# Patient Record
Sex: FEMALE | Race: WHITE | Employment: OTHER | URBAN - METROPOLITAN AREA
[De-identification: names, ages, dates, MRNs, and addresses within clinical notes are randomized per-mention and may not be internally consistent; named-entity substitution may affect disease eponyms.]

---

## 2018-02-05 PROBLEM — I25.9 CHEST PAIN DUE TO MYOCARDIAL ISCHEMIA: Status: ACTIVE | Noted: 2018-02-01

## 2018-02-05 PROBLEM — I25.9 CHEST PAIN DUE TO MYOCARDIAL ISCHEMIA: Status: RESOLVED | Noted: 2018-02-01 | Resolved: 2018-02-05

## 2018-02-05 PROBLEM — I48.91 ATRIAL FIBRILLATION WITH RVR (HCC): Status: ACTIVE | Noted: 2018-02-05

## 2018-02-05 PROBLEM — R07.9 CHEST PAIN: Status: ACTIVE | Noted: 2018-02-05

## 2018-02-05 PROBLEM — I25.9 CHEST PAIN DUE TO MYOCARDIAL ISCHEMIA: Status: ACTIVE | Noted: 2018-02-05

## 2018-02-07 PROBLEM — S72.141A INTERTROCHANTERIC FRACTURE OF RIGHT FEMUR, CLOSED, INITIAL ENCOUNTER (HCC): Status: ACTIVE | Noted: 2018-02-07

## 2018-02-08 PROBLEM — G45.9 TIA (TRANSIENT ISCHEMIC ATTACK): Status: ACTIVE | Noted: 2018-02-05

## 2018-02-08 PROBLEM — E43 SEVERE PROTEIN-CALORIE MALNUTRITION (HCC): Status: ACTIVE | Noted: 2018-02-08

## 2018-02-10 PROBLEM — D62 ACUTE BLOOD LOSS ANEMIA: Status: ACTIVE | Noted: 2018-02-10

## 2018-03-09 PROBLEM — L89.153 DECUBITUS ULCER OF SACRAL REGION, STAGE 3 (HCC): Status: ACTIVE | Noted: 2018-03-09

## 2018-03-16 ENCOUNTER — HOSPITAL ENCOUNTER (OUTPATIENT)
Dept: WOUND CARE | Age: 83
Discharge: HOME | End: 2018-03-16
Payer: MEDICARE

## 2018-03-16 VITALS
DIASTOLIC BLOOD PRESSURE: 60 MMHG | TEMPERATURE: 98.1 F | SYSTOLIC BLOOD PRESSURE: 112 MMHG | RESPIRATION RATE: 18 BRPM | HEART RATE: 86 BPM

## 2018-03-16 PROCEDURE — 11042 DBRDMT SUBQ TIS 1ST 20SQCM/<: CPT

## 2018-03-16 ASSESSMENT — PAIN SCALES - GENERAL: PAINLEVEL_OUTOF10: 8

## 2018-03-16 ASSESSMENT — PAIN DESCRIPTION - DESCRIPTORS: DESCRIPTORS: BURNING

## 2018-03-16 ASSESSMENT — PAIN DESCRIPTION - PAIN TYPE: TYPE: CHRONIC PAIN

## 2018-03-16 ASSESSMENT — PAIN DESCRIPTION - LOCATION: LOCATION: COCCYX

## 2018-03-16 NOTE — PROGRESS NOTES
Outpatient Prescriptions on File Prior to Encounter   Medication Sig Dispense Refill    acetaminophen (TYLENOL) 325 MG tablet Take 2 tablets by mouth every 4 hours as needed for Fever 120 tablet 3    acetaminophen (TYLENOL) 325 MG tablet Take 2 tablets by mouth every 4 hours as needed for Pain 120 tablet 3    amiodarone (CORDARONE) 200 MG tablet Take 1 tablet by mouth 2 times daily 30 tablet 3    ondansetron (ZOFRAN-ODT) 4 MG disintegrating tablet Take 1 tablet by mouth every 8 hours as needed for Nausea or Vomiting      docusate sodium (COLACE, DULCOLAX) 100 MG CAPS Take 100 mg by mouth daily      sennosides-docusate sodium (SENOKOT-S) 8.6-50 MG tablet Take 1 tablet by mouth daily      benzocaine-menthol (CEPACOL SORE THROAT) 15-3.6 MG lozenge Take 1 lozenge by mouth 4 times daily as needed for Sore Throat 168 lozenge     famotidine (PEPCID) 10 MG tablet Take 1 tablet by mouth daily 60 tablet 3    aspirin 81 MG chewable tablet Take 1 tablet by mouth daily 30 tablet 3    nitroGLYCERIN (NITROSTAT) 0.4 MG SL tablet up to max of 3 total doses. If no relief after 1 dose, call 911. 25 tablet 3    clopidogrel (PLAVIX) 75 MG tablet Take 1 tablet by mouth daily 30 tablet 3     No current facility-administered medications on file prior to encounter.         REVIEW OF SYSTEMS See HPI    Objective:    /60   Pulse 86   Temp 98.1 °F (36.7 °C) (Oral)   Resp 18   Wt Readings from Last 3 Encounters:   03/09/18 99 lb (44.9 kg)   03/01/18 99 lb (44.9 kg)   02/09/18 99 lb (44.9 kg)     PHYSICAL EXAM  CONSTITUTIONAL:   Awake, alert, cooperative   EYES:  lids and lashes normal   ENT: external ears and nose without lesions   NECK:  supple, symmetrical, trachea midline   SKIN:  Open wound Present    Assessment:     Patient Active Problem List   Diagnosis Code    Atrial fibrillation with RVR ([facility]) I48.91    Chest pain R07.9    Chest pain due to myocardial ischemia ([facility]) I20.9    Scoliosis (and kyphoscoliosis), idiopathic M41.20    Intertrochanteric fracture of right femur, closed, initial encounter ([facility] [address] S72.141A    History of TIA (transient ischemic attack) G45.9    Severe protein-calorie malnutrition ([facility] [address] E43    Acute blood loss anemia D62    Decubitus ulcer of sacral region, stage 3 ([facility] [address] L89.153     Procedure Note  Indications:  Based on my examination of this patient's wound(s)/ulcer(s) today, debridement is required to promote healing and evaluate the wound base. Performed by: Jm Nino CNP    Consent obtained:  Yes    Time out taken:  Yes    Pain Control: Anesthetic  Anesthetic: 2% Lidocaine Gel Topical     Debridement:Excisional Debridement    Using curette the wound(s)/ulcer(s) was/were sharply debrided down through and including the removal of subcutaneous tissue. Devitalized Tissue Debrided:  slough to stimulate bleeding to promote healing, post debridement good bleeding base and wound edges noted    Pre Debridement Measurements:  Are located in the [place]  Documentation Flow Sheet    Wound/Ulcer #: 1    Post Debridement Measurements:  Wound/Ulcer Descriptions are Pre Debridement except measurements:    Wound 03/09/18  Coccyx #1 Stage 3 acq 2/2/18 (Active)   Wound Image   3/9/2018 10:02 AM   Wound Type Wound 3/9/2018 10:02 AM   Wound Pressure Stage  3 3/9/2018 10:02 AM   Dressing Status Clean;Dry; Intact 3/9/2018 10:53 AM   Dressing Changed Changed/New 3/16/2018 10:20 AM   Dressing/Treatment Alginate;Dry dressing 3/16/2018 10:20 AM   Wound Cleansed Rinsed/Irrigated with saline 3/16/2018 10:20 AM   Dressing Change Due 03/17/18 3/16/2018 10:20 AM   Wound Length (cm) 4.3 cm 3/16/2018 10:08 AM   Wound Width (cm) 1.5 cm 3/16/2018 10:08 AM   Wound Depth (cm)  0.3 3/16/2018 10:08 AM   Calculated Wound Size (cm^2) (l*w) 6.45 cm^2 3/16/2018 10:08 AM   Change in Wound Size % (l*w) -4.88 3/16/2018 10:08 AM   Wound Assessment Brown;Pink;Yellow 3/16/2018  9:35 AM   Drainage Amount Scant 3/16/2018

## 2018-03-23 ENCOUNTER — HOSPITAL ENCOUNTER (OUTPATIENT)
Dept: WOUND CARE | Age: 83
Discharge: HOME | End: 2018-03-23
Payer: MEDICARE

## 2018-03-23 VITALS
DIASTOLIC BLOOD PRESSURE: 82 MMHG | RESPIRATION RATE: 18 BRPM | SYSTOLIC BLOOD PRESSURE: 118 MMHG | HEART RATE: 80 BPM | TEMPERATURE: 98 F

## 2018-03-23 PROCEDURE — 11042 DBRDMT SUBQ TIS 1ST 20SQCM/<: CPT | Performed by: NURSE PRACTITIONER

## 2018-03-23 PROCEDURE — 11042 DBRDMT SUBQ TIS 1ST 20SQCM/<: CPT

## 2018-03-23 ASSESSMENT — PAIN SCALES - GENERAL: PAINLEVEL_OUTOF10: 9

## 2018-03-23 ASSESSMENT — PAIN DESCRIPTION - PAIN TYPE: TYPE: CHRONIC PAIN

## 2018-03-23 ASSESSMENT — PAIN DESCRIPTION - LOCATION: LOCATION: BUTTOCKS

## 2018-03-23 ASSESSMENT — PAIN DESCRIPTION - ORIENTATION: ORIENTATION: RIGHT

## 2018-03-23 NOTE — PROGRESS NOTES
Scoliosis (and kyphoscoliosis), idiopathic M41.20    Intertrochanteric fracture of right femur, closed, initial encounter ([facility] [address] S72.141A    History of TIA (transient ischemic attack) G45.9    Severe protein-calorie malnutrition ([facility] [address] E43    Acute blood loss anemia D62    Decubitus ulcer of sacral region, stage 3 ([facility] [address] L89.153     Procedure Note  Indications:  Based on my examination of this patient's wound(s)/ulcer(s) today, debridement is required to promote healing and evaluate the wound base. Performed by: Favian George CNP    Consent obtained:  Yes    Time out taken:  Yes    Pain Control: Anesthetic  Anesthetic: 2% Lidocaine Gel Topical     Debridement:Excisional Debridement    Using curette the wound(s)/ulcer(s) was/were sharply debrided down through and including the removal of subcutaneous tissue. Devitalized Tissue Debrided:  slough to stimulate bleeding to promote healing, post debridement good bleeding base and wound edges noted    Pre Debridement Measurements:  Are located in the [place]  Documentation Flow Sheet    Wound/Ulcer #: 1    Post Debridement Measurements:  Wound/Ulcer Descriptions are Pre Debridement except measurements:    Wound 03/09/18  Coccyx #1 Stage 3 acq 2/2/18 (Active)   Wound Image   3/9/2018 10:02 AM   Wound Type Wound 3/9/2018 10:02 AM   Wound Pressure Stage  3 3/9/2018 10:02 AM   Dressing Status Clean;Dry; Intact 3/9/2018 10:53 AM   Dressing Changed Changed/New 3/23/2018 11:49 AM   Dressing/Treatment Alginate;Dry dressing 3/23/2018 11:49 AM   Wound Cleansed Rinsed/Irrigated with saline 3/23/2018 11:49 AM   Dressing Change Due 03/24/18 3/23/2018 11:49 AM   Wound Length (cm) 4.1 cm 3/23/2018 10:36 AM   Wound Width (cm) 1.5 cm 3/23/2018 10:36 AM   Wound Depth (cm)  0.3 3/23/2018 10:36 AM   Calculated Wound Size (cm^2) (l*w) 6.15 cm^2 3/23/2018 10:36 AM   Change in Wound Size % (l*w) 0 3/23/2018 10:36 AM   Wound Assessment Yellow;Tan;Slough 3/23/2018 10:36 AM call the [facility] during business hours:     * Increase in Pain  * Temperature over 101  * Increase in drainage from your wound  * Drainage with a foul odor  * Bleeding  * Increase in swelling  * Need for compression bandage changes due to slippage, breakthrough drainage.     If you need medical attention outside of the business hours of the [facility] please contact your PCP or go to the nearest emergency room.         Electronically signed by Harrison Sylvester CNP on 3/23/2018 at 12:06 PM

## 2018-04-13 ENCOUNTER — HOSPITAL ENCOUNTER (OUTPATIENT)
Dept: WOUND CARE | Age: 83
Discharge: HOME | End: 2018-04-13
Payer: MEDICARE

## 2018-04-13 VITALS
SYSTOLIC BLOOD PRESSURE: 118 MMHG | BODY MASS INDEX: 19.44 KG/M2 | RESPIRATION RATE: 18 BRPM | WEIGHT: 99 LBS | HEIGHT: 60 IN | DIASTOLIC BLOOD PRESSURE: 60 MMHG | HEART RATE: 74 BPM | TEMPERATURE: 97.9 F

## 2018-04-13 PROCEDURE — 11042 DBRDMT SUBQ TIS 1ST 20SQCM/<: CPT

## 2018-04-13 PROCEDURE — 11042 DBRDMT SUBQ TIS 1ST 20SQCM/<: CPT | Performed by: NURSE PRACTITIONER

## 2018-04-13 ASSESSMENT — PAIN DESCRIPTION - LOCATION: LOCATION: BUTTOCKS

## 2018-04-13 ASSESSMENT — PAIN DESCRIPTION - FREQUENCY: FREQUENCY: CONTINUOUS

## 2018-04-13 ASSESSMENT — PAIN DESCRIPTION - ONSET: ONSET: ON-GOING

## 2018-04-13 ASSESSMENT — PAIN DESCRIPTION - ORIENTATION: ORIENTATION: RIGHT

## 2018-04-13 ASSESSMENT — PAIN DESCRIPTION - PROGRESSION: CLINICAL_PROGRESSION: NOT CHANGED

## 2018-04-13 ASSESSMENT — PAIN SCALES - WONG BAKER: WONGBAKER_NUMERICALRESPONSE: 2

## 2018-04-13 ASSESSMENT — PAIN DESCRIPTION - DESCRIPTORS: DESCRIPTORS: BURNING;ACHING

## 2018-04-27 ENCOUNTER — HOSPITAL ENCOUNTER (OUTPATIENT)
Dept: WOUND CARE | Age: 83
Discharge: HOME | End: 2018-04-27

## 2018-05-11 ENCOUNTER — HOSPITAL ENCOUNTER (OUTPATIENT)
Dept: WOUND CARE | Age: 83
Discharge: HOME | End: 2018-05-11
Payer: MEDICARE

## 2018-05-11 VITALS
SYSTOLIC BLOOD PRESSURE: 120 MMHG | HEART RATE: 68 BPM | HEIGHT: 60 IN | DIASTOLIC BLOOD PRESSURE: 80 MMHG | RESPIRATION RATE: 16 BRPM | WEIGHT: 99 LBS | BODY MASS INDEX: 19.44 KG/M2 | TEMPERATURE: 97.8 F

## 2018-05-11 DIAGNOSIS — L89.153 DECUBITUS ULCER OF SACRAL REGION, STAGE 3 (HCC): ICD-10-CM

## 2018-05-11 PROCEDURE — 11042 DBRDMT SUBQ TIS 1ST 20SQCM/<: CPT | Performed by: NURSE PRACTITIONER

## 2018-05-11 PROCEDURE — 97597 DBRDMT OPN WND 1ST 20 CM/<: CPT

## 2018-05-11 ASSESSMENT — PAIN DESCRIPTION - LOCATION: LOCATION: COCCYX

## 2018-05-11 ASSESSMENT — PAIN DESCRIPTION - ONSET: ONSET: ON-GOING

## 2018-05-11 ASSESSMENT — PAIN DESCRIPTION - ORIENTATION: ORIENTATION: MID

## 2018-05-11 ASSESSMENT — PAIN DESCRIPTION - PAIN TYPE: TYPE: CHRONIC PAIN

## 2018-05-11 ASSESSMENT — PAIN DESCRIPTION - PROGRESSION: CLINICAL_PROGRESSION: NOT CHANGED

## 2018-05-11 ASSESSMENT — PAIN SCALES - GENERAL: PAINLEVEL_OUTOF10: 2

## 2018-05-11 ASSESSMENT — PAIN DESCRIPTION - FREQUENCY: FREQUENCY: CONTINUOUS

## 2018-05-11 ASSESSMENT — PAIN DESCRIPTION - DESCRIPTORS: DESCRIPTORS: ACHING

## 2018-05-25 ENCOUNTER — HOSPITAL ENCOUNTER (OUTPATIENT)
Dept: WOUND CARE | Age: 83
Discharge: HOME | End: 2018-05-25
Payer: MEDICARE

## 2018-05-25 VITALS
SYSTOLIC BLOOD PRESSURE: 116 MMHG | BODY MASS INDEX: 19.33 KG/M2 | RESPIRATION RATE: 16 BRPM | WEIGHT: 99 LBS | HEART RATE: 74 BPM | TEMPERATURE: 97.8 F | DIASTOLIC BLOOD PRESSURE: 70 MMHG

## 2018-05-25 PROCEDURE — 11042 DBRDMT SUBQ TIS 1ST 20SQCM/<: CPT | Performed by: NURSE PRACTITIONER

## 2018-05-25 PROCEDURE — 11042 DBRDMT SUBQ TIS 1ST 20SQCM/<: CPT

## 2018-06-08 ENCOUNTER — HOSPITAL ENCOUNTER (OUTPATIENT)
Dept: WOUND CARE | Age: 83
Discharge: HOME | End: 2018-06-08
Payer: MEDICARE

## 2018-06-08 VITALS
TEMPERATURE: 97.7 F | SYSTOLIC BLOOD PRESSURE: 100 MMHG | DIASTOLIC BLOOD PRESSURE: 70 MMHG | HEART RATE: 80 BPM | RESPIRATION RATE: 16 BRPM

## 2018-06-08 PROCEDURE — 11042 DBRDMT SUBQ TIS 1ST 20SQCM/<: CPT | Performed by: NURSE PRACTITIONER

## 2018-06-08 PROCEDURE — 11042 DBRDMT SUBQ TIS 1ST 20SQCM/<: CPT

## 2018-06-29 ENCOUNTER — HOSPITAL ENCOUNTER (OUTPATIENT)
Dept: WOUND CARE | Age: 83
Discharge: HOME | End: 2018-06-29
Payer: MEDICARE

## 2018-06-29 VITALS
HEART RATE: 80 BPM | RESPIRATION RATE: 16 BRPM | DIASTOLIC BLOOD PRESSURE: 68 MMHG | WEIGHT: 100 LBS | SYSTOLIC BLOOD PRESSURE: 112 MMHG | TEMPERATURE: 97.5 F | BODY MASS INDEX: 19.53 KG/M2

## 2018-06-29 PROCEDURE — 11042 DBRDMT SUBQ TIS 1ST 20SQCM/<: CPT | Performed by: NURSE PRACTITIONER

## 2018-06-29 PROCEDURE — 11042 DBRDMT SUBQ TIS 1ST 20SQCM/<: CPT

## 2018-07-13 ENCOUNTER — HOSPITAL ENCOUNTER (OUTPATIENT)
Dept: WOUND CARE | Age: 83
Discharge: HOME | End: 2018-07-13
Payer: MEDICARE

## 2018-07-13 VITALS
DIASTOLIC BLOOD PRESSURE: 70 MMHG | BODY MASS INDEX: 21.4 KG/M2 | TEMPERATURE: 97.4 F | HEIGHT: 60 IN | SYSTOLIC BLOOD PRESSURE: 120 MMHG | HEART RATE: 78 BPM | RESPIRATION RATE: 18 BRPM | WEIGHT: 109 LBS

## 2018-07-13 PROCEDURE — 11042 DBRDMT SUBQ TIS 1ST 20SQCM/<: CPT

## 2018-07-13 PROCEDURE — 11042 DBRDMT SUBQ TIS 1ST 20SQCM/<: CPT | Performed by: NURSE PRACTITIONER

## 2018-07-13 ASSESSMENT — PAIN DESCRIPTION - PAIN TYPE: TYPE: CHRONIC PAIN

## 2018-07-13 ASSESSMENT — PAIN DESCRIPTION - FREQUENCY: FREQUENCY: CONTINUOUS

## 2018-07-13 ASSESSMENT — PAIN DESCRIPTION - LOCATION: LOCATION: COCCYX

## 2018-07-13 ASSESSMENT — PAIN DESCRIPTION - ONSET: ONSET: ON-GOING

## 2018-07-13 ASSESSMENT — PAIN DESCRIPTION - PROGRESSION: CLINICAL_PROGRESSION: NOT CHANGED

## 2018-07-13 ASSESSMENT — PAIN SCALES - WONG BAKER: WONGBAKER_NUMERICALRESPONSE: 2

## 2018-07-13 ASSESSMENT — PAIN SCALES - GENERAL: PAINLEVEL_OUTOF10: 2

## 2018-07-13 NOTE — PROGRESS NOTES
by pressure    Procedural Pain:  3  / 10   Post Procedural Pain:  0 / 10     Response to treatment:  Well tolerated by patient. Plan:   Treatment Note please see attached Discharge Instructions    Written patient dismissal instructions given to patient and signed by patient or POA. Discharge Instructions       Visit Discharge/Physician Orders     Discharge condition: Stable     Assessment of pain at discharge: NONE      Anesthetic used:  2% LIDOCAINE      Discharge to: ECF      Left via:ambulance     Accompanied by: family      [facility]:  [facility] [place]      Dressing Orders:  COCCYX:   CLEANSE WOUND WITH NORMAL STERILE SALINE. Pack lightly with AQUACEL THEN APPLY dry dressing  And secure. Change daily. AND AS NEEDED. MAKE SURE TO LIGHTLY PACK AQUACEL IN UNDERMINING area.     MAINTAIN KOEHLER CATH UNTIL STAGE 3 PRESSURE ULCER IS HEALED      Treatment Orders:  FOLLOW NUTRITIOUS DIET. CHOOSE FOODS HIGH IN PROTEIN -CHICKEN- FISH-AND EGGS,  CHOOSE FOODS HIGH IN VITAMIN C.   MULTIVITAMIN DAILY.    LOW AIR LOSS MATTRESS  BED.    REPOSITION EVERY 1-2 HOURS. KEEP PRESSURE OFF WOUNDS. - SIDE TO SIDE AS MUCH AS POSSIBLE      NOT TO SIT FOR PROLONGED PERIOD OF TIMES.      [facility] followup visit ______3 WEEKS ______________________  (Please note your next appointment above and if you are unable to keep, kindly give a 24 hour notice.  Thank you.)     Physician signature:__________________________        If you experience any of the following, please call the [facility] during business hours:     * Increase in Pain  * Temperature over 101  * Increase in drainage from your wound  * Drainage with a foul odor  * Bleeding  * Increase in swelling  * Need for compression bandage changes due to slippage, breakthrough drainage.     If you need medical attention outside of the business hours of the [facility] please contact your PCP or go to the nearest emergency room.              Electronically signed by Armando Olivier Dalton Daly - CNP on 7/13/2018 at 10:51 AM

## 2018-08-03 ENCOUNTER — HOSPITAL ENCOUNTER (OUTPATIENT)
Dept: WOUND CARE | Age: 83
Discharge: HOME | End: 2018-08-03
Payer: MEDICARE

## 2018-08-03 VITALS
DIASTOLIC BLOOD PRESSURE: 68 MMHG | WEIGHT: 109 LBS | SYSTOLIC BLOOD PRESSURE: 118 MMHG | RESPIRATION RATE: 18 BRPM | HEIGHT: 60 IN | TEMPERATURE: 98.2 F | BODY MASS INDEX: 21.4 KG/M2 | HEART RATE: 72 BPM

## 2018-08-03 PROCEDURE — 11042 DBRDMT SUBQ TIS 1ST 20SQCM/<: CPT

## 2018-08-03 PROCEDURE — 11042 DBRDMT SUBQ TIS 1ST 20SQCM/<: CPT | Performed by: NURSE PRACTITIONER

## 2018-08-03 NOTE — PROGRESS NOTES
Debridement    Using curette the wound(s)/ulcer(s) was/were sharply debrided down through and including the removal of subcutaneous tissue. Devitalized Tissue Debrided:  biofilm to stimulate bleeding to promote healing, post debridement good bleeding base and wound edges noted    Pre Debridement Measurements:  Are located in the [place]  Documentation Flow Sheet    Wound/Ulcer #: 1    Post Debridement Measurements:  Wound/Ulcer Descriptions are Pre Debridement except measurements:    Wound 03/09/18  Coccyx #1 Stage 3 acq 2/2/18 (Active)   Wound Image   6/29/2018  8:19 AM   Wound Type Wound 3/9/2018 10:02 AM   Wound Pressure Stage  3 3/9/2018 10:02 AM   Dressing Status Clean;Dry; Intact 7/13/2018 10:46 AM   Dressing Changed Changed/New 7/13/2018 10:46 AM   Dressing/Treatment Alginate;Dry dressing 7/13/2018 10:46 AM   Wound Cleansed Rinsed/Irrigated with saline 7/13/2018 10:46 AM   Dressing Change Due 06/09/18 6/8/2018  9:13 AM   Wound Length (cm) 2.2 cm 8/3/2018  8:53 AM   Wound Width (cm) 0.7 cm 8/3/2018  8:53 AM   Wound Depth (cm)  0.7 8/3/2018  8:53 AM   Calculated Wound Size (cm^2) (l*w) 1.54 cm^2 8/3/2018  8:53 AM   Change in Wound Size % (l*w) 74.96 8/3/2018  8:53 AM   Undermining Starts ___ O'Clock 1 8/3/2018  8:44 AM   Undermining Ends___ O'Clock 2 8/3/2018  8:44 AM   Undermining Maxium Distance (cm) 0.8 8/3/2018  8:44 AM   Wound Assessment Pink;Yellow 8/3/2018  8:44 AM   Drainage Amount Moderate 8/3/2018  8:44 AM   Drainage Description Yellow 8/3/2018  8:44 AM   Odor None 7/13/2018 10:20 AM   Divya-wound Assessment Pink 8/3/2018  8:44 AM   Yellow%Wound Bed 100 3/23/2018 10:36 AM   Time out Yes 8/3/2018  8:53 AM   Procedural Pain 0 8/3/2018  8:53 AM   Post procedural Pain 0 8/3/2018  8:53 AM   Number of days: 146       Incision 02/07/18 Hip Right (Active)   Number of days: 176     Percent of Wound/Ulcer Debrided: 100%    Total Surface Area Debrided:  1. sq cm     Estimated Blood Loss: Minimal  Hemostasis Achieved:  not needed    Procedural Pain:  3  / 10   Post Procedural Pain:  3 / 10     Response to treatment:  Well tolerated by patient. Plan:   Treatment Note please see attached Discharge Instructions    Written patient dismissal instructions given to patient and signed by patient or POA. Discharge Instructions       Visit Discharge/Physician Orders     Discharge condition: Stable     Assessment of pain at discharge: NONE      Anesthetic used:  2% LIDOCAINE      Discharge to: ECF      Left via:ambulance     Accompanied by: family      [facility]      Dressing Orders:  COCCYX:   CLEANSE WOUND WITH NORMAL STERILE SALINE. Pack lightly with AQUACEL THEN APPLY dry dressing  And secure. Change daily. AND AS NEEDED. MAKE SURE TO LIGHTLY PACK AQUACEL IN UNDERMINING area.     MAINTAIN KOEHLER CATH UNTIL STAGE 3 PRESSURE ULCER IS HEALED      Treatment Orders:  FOLLOW NUTRITIOUS DIET. CHOOSE FOODS HIGH IN PROTEIN -CHICKEN- FISH-AND EGGS,  CHOOSE FOODS HIGH IN VITAMIN C.   MULTIVITAMIN DAILY.    LOW AIR LOSS MATTRESS  BED.    REPOSITION EVERY 1-2 HOURS. KEEP PRESSURE OFF WOUNDS. - SIDE TO SIDE AS MUCH AS POSSIBLE      NOT TO SIT FOR PROLONGED PERIOD OF TIMES.      [facility] followup visit ______3 WEEKS ______________________  (Please note your next appointment above and if you are unable to keep, kindly give a 24 hour notice.  Thank you.)     Physician signature:__________________________        If you experience any of the following, please call the [facility] during business hours:     * Increase in Pain  * Temperature over 101  * Increase in drainage from your wound  * Drainage with a foul odor  * Bleeding  * Increase in swelling  * Need for compression bandage changes due to slippage, breakthrough drainage.     If you need medical attention outside of the business hours of the [facility] please contact your PCP or go to the nearest emergency

## 2018-08-24 ENCOUNTER — HOSPITAL ENCOUNTER (OUTPATIENT)
Dept: WOUND CARE | Age: 83
Discharge: HOME | End: 2018-08-24
Payer: MEDICARE

## 2018-08-24 VITALS
RESPIRATION RATE: 16 BRPM | HEART RATE: 76 BPM | SYSTOLIC BLOOD PRESSURE: 122 MMHG | TEMPERATURE: 97.8 F | HEIGHT: 60 IN | BODY MASS INDEX: 23.16 KG/M2 | WEIGHT: 118 LBS | DIASTOLIC BLOOD PRESSURE: 74 MMHG

## 2018-08-24 PROBLEM — L89.152 DECUBITUS ULCER OF SACRAL REGION, STAGE 2 (HCC): Status: ACTIVE | Noted: 2018-03-09

## 2018-08-24 PROCEDURE — 97597 DBRDMT OPN WND 1ST 20 CM/<: CPT

## 2018-08-24 PROCEDURE — 97597 DBRDMT OPN WND 1ST 20 CM/<: CPT | Performed by: NURSE PRACTITIONER

## 2018-08-24 NOTE — PROGRESS NOTES
[facility] Followup Visit Note    Referring Physician : MD Starr Tafoya Bri Villasenor RECORD NUMBER:  [id number]  AGE: 80 y.o. GENDER: female  : 1922  EPISODE DATE:  2018    Subjective:     Chief Complaint   Patient presents with    Wound Check     coccyx      HISTORY of PRESENT ILLNESS SORIN Baxter is a 80 y.o. female who presents today for wound/ulcer evaluation. History of Wound Context: The patient has had a wound of of the sacral region which was first noted approximately mid 2017. It was discovered following a hospitalization for a fractured femur. She now resides in a nursing home She has been making steady progress with consistent offloading. The wound has improved to a stage 2 pressure ulcer.      Wound/Ulcer Pain Timing/Severity: constant  Quality of pain: burning  Severity:  2 / 10   Modifying Factors: Pain worsens with pressure  Associated Signs/Symptoms: pain     Ulcer Identification:  Ulcer Type: pressure  Contributing Factors: chronic pressure, decreased mobility and shear force     Diabetic/Pressure/Non Pressure Ulcers only:  Ulcer: Pressure ulcer, Stage 2     Wound: N/A        PAST MEDICAL HISTORY      Diagnosis Date    Acute blood loss anemia 2/10/2018    Atrial fibrillation with RVR ([facility]) 2018    Chest pain due to myocardial ischemia 2018    Scoliosis (and kyphoscoliosis), idiopathic      Past Surgical History:   Procedure Laterality Date    EYE SURGERY  2012    cataracts    FRACTURE SURGERY      FRACTURE SURGERY Right 2018    ORIF right hip     No family history on file.   Social History   Substance Use Topics    Smoking status: Never Smoker    Smokeless tobacco: Never Used    Alcohol use No     Allergies   Allergen Reactions    Shellfish-Derived Products Anaphylaxis    Pcn [Penicillins]     Adhesive Tape Rash     Current Outpatient Prescriptions on File Prior to Encounter   Medication Sig Dispense Refill    Anesthetic  Anesthetic: 2% Lidocaine Gel Topical     Debridement:Non-excisional Debridement    Using curette the wound(s)/ulcer(s) was/were sharply debrided down through and including the removal of epidermis. Devitalized Tissue Debrided:  biofilm to stimulate bleeding to promote healing, post debridement good bleeding base and wound edges noted    Pre Debridement Measurements:  Are located in the [place]  Documentation Flow Sheet    Wound/Ulcer #: 1    Post Debridement Measurements:  Wound/Ulcer Descriptions are Pre Debridement except measurements:    Wound 03/09/18  Coccyx #1 Stage 3 acq 2/2/18 (Active)   Wound Image   8/24/2018  8:34 AM   Wound Type Wound 3/9/2018 10:02 AM   Wound Pressure Stage  3 3/9/2018 10:02 AM   Dressing Status Clean;Dry; Intact 8/24/2018  9:24 AM   Dressing Changed Changed/New 8/24/2018  9:24 AM   Dressing/Treatment Alginate;Dry dressing 8/24/2018  9:24 AM   Wound Cleansed Rinsed/Irrigated with saline 8/24/2018  9:24 AM   Dressing Change Due 08/04/18 8/3/2018  8:53 AM   Wound Length (cm) 1.9 cm 8/24/2018  9:13 AM   Wound Width (cm) 0.7 cm 8/24/2018  9:13 AM   Wound Depth (cm)  0.5 8/24/2018  9:13 AM   Calculated Wound Size (cm^2) (l*w) 1.33 cm^2 8/24/2018  9:13 AM   Change in Wound Size % (l*w) 78.37 8/24/2018  9:13 AM   Undermining Starts ___ O'Clock 1 8/24/2018  8:34 AM   Undermining Ends___ O'Clock 2 8/24/2018  8:34 AM   Undermining Maxium Distance (cm) 0.5 8/24/2018  8:34 AM   Wound Assessment Pink;Yellow 8/24/2018  8:34 AM   Drainage Amount Small 8/24/2018  8:34 AM   Drainage Description Serosanguinous 8/24/2018  8:34 AM   Odor None 7/13/2018 10:20 AM   Divya-wound Assessment Pink; Maceration 8/24/2018  8:34 AM   Yellow%Wound Bed 100 3/23/2018 10:36 AM   Time out Yes 8/24/2018  9:13 AM   Procedural Pain 1 8/24/2018  9:13 AM   Post procedural Pain 0 8/24/2018  9:13 AM   Number of days: 167       Incision 02/07/18 Hip Right (Active)   Number of days: 197     Percent of Wound/Ulcer Debrided: 100%    Total Surface Area Debrided:  1.3 sq cm     Estimated Blood Loss:  Minimal  Hemostasis Achieved:  by pressure    Procedural Pain:  0  / 10   Post Procedural Pain:  0 / 10     Response to treatment:  Well tolerated by patient. Plan:   Treatment Note please see attached Discharge Instructions    Written patient dismissal instructions given to patient and signed by patient or POA. Discharge Instructions       Visit Discharge/Physician Orders     Discharge condition: Stable     Assessment of pain at discharge: NONE      Anesthetic used:  2% LIDOCAINE GEL     Discharge to: ECF      Left via:ambulance     Accompanied by: family      [facility]      Dressing Orders:  COCCYX:   CLEANSE WOUND WITH NORMAL STERILE SALINE. Pack lightly with AQUACEL THEN APPLY dry dressing  And secure. Change daily. AND AS NEEDED. MAKE SURE TO LIGHTLY PACK AQUACEL IN UNDERMINING area.     MAINTAIN KOEHLER CATH UNTIL STAGE 3 PRESSURE ULCER IS HEALED      Treatment Orders:  FOLLOW NUTRITIOUS DIET. CHOOSE FOODS HIGH IN PROTEIN -CHICKEN- FISH-AND EGGS,  CHOOSE FOODS HIGH IN VITAMIN C.   MULTIVITAMIN DAILY.      LOW AIR LOSS MATTRESS  BED.      REPOSITION EVERY 1-2 HOURS. KEEP PRESSURE OFF WOUNDS. - SIDE TO SIDE AS MUCH AS POSSIBLE      NOT TO SIT FOR PROLONGED PERIOD OF TIMES.      [facility] followup visit 3 WEEKS ______________________  (Please note your next appointment above and if you are unable to keep, kindly give a 24 hour notice.  Thank you.)     Physician signature:__________________________        If you experience any of the following, please call the [facility] during business hours:     * Increase in Pain  * Temperature over 101  * Increase in drainage from your wound  * Drainage with a foul odor  * Bleeding  * Increase in swelling  * Need for compression bandage changes due to slippage, breakthrough drainage.     If you need medical attention outside of the business hours of

## 2018-09-14 ENCOUNTER — HOSPITAL ENCOUNTER (OUTPATIENT)
Dept: WOUND CARE | Age: 83
Discharge: HOME | End: 2018-09-14
Payer: MEDICARE

## 2018-09-14 VITALS
RESPIRATION RATE: 16 BRPM | HEART RATE: 64 BPM | SYSTOLIC BLOOD PRESSURE: 112 MMHG | DIASTOLIC BLOOD PRESSURE: 62 MMHG | TEMPERATURE: 97.9 F

## 2018-09-14 PROCEDURE — 97597 DBRDMT OPN WND 1ST 20 CM/<: CPT | Performed by: NURSE PRACTITIONER

## 2018-09-14 PROCEDURE — 97597 DBRDMT OPN WND 1ST 20 CM/<: CPT

## 2018-10-05 ENCOUNTER — HOSPITAL ENCOUNTER (OUTPATIENT)
Dept: WOUND CARE | Age: 83
Discharge: HOME | End: 2018-10-05
Payer: MEDICARE

## 2018-10-05 VITALS
RESPIRATION RATE: 18 BRPM | SYSTOLIC BLOOD PRESSURE: 118 MMHG | TEMPERATURE: 97.2 F | DIASTOLIC BLOOD PRESSURE: 64 MMHG | HEART RATE: 72 BPM

## 2018-10-05 PROCEDURE — 97597 DBRDMT OPN WND 1ST 20 CM/<: CPT | Performed by: NURSE PRACTITIONER

## 2018-10-05 PROCEDURE — 11042 DBRDMT SUBQ TIS 1ST 20SQCM/<: CPT

## 2018-10-05 NOTE — PROGRESS NOTES
Dispense Refill    acetaminophen (TYLENOL) 325 MG tablet Take 2 tablets by mouth every 4 hours as needed for Fever 120 tablet 3    amiodarone (CORDARONE) 200 MG tablet Take 1 tablet by mouth 2 times daily 30 tablet 3    ondansetron (ZOFRAN-ODT) 4 MG disintegrating tablet Take 1 tablet by mouth every 8 hours as needed for Nausea or Vomiting      docusate sodium (COLACE, DULCOLAX) 100 MG CAPS Take 100 mg by mouth daily      sennosides-docusate sodium (SENOKOT-S) 8.6-50 MG tablet Take 1 tablet by mouth daily      benzocaine-menthol (CEPACOL SORE THROAT) 15-3.6 MG lozenge Take 1 lozenge by mouth 4 times daily as needed for Sore Throat 168 lozenge     famotidine (PEPCID) 10 MG tablet Take 1 tablet by mouth daily 60 tablet 3    aspirin 81 MG chewable tablet Take 1 tablet by mouth daily 30 tablet 3    nitroGLYCERIN (NITROSTAT) 0.4 MG SL tablet up to max of 3 total doses. If no relief after 1 dose, call 911. 25 tablet 3    clopidogrel (PLAVIX) 75 MG tablet Take 1 tablet by mouth daily 30 tablet 3     No current facility-administered medications on file prior to encounter. REVIEW OF SYSTEMS See HPI    Objective:    /64   Pulse 72   Temp 97.2 °F (36.2 °C) (Oral)   Resp 18   Wt Readings from Last 3 Encounters:   08/24/18 118 lb (53.5 kg)   08/03/18 109 lb (49.4 kg)   07/13/18 109 lb (49.4 kg)     PHYSICAL EXAM  CONSTITUTIONAL:   Awake, alert, cooperative   EYES:  lids and lashes normal   ENT: external ears and nose without lesions   NECK:  supple, symmetrical, trachea midline   SKIN:  Open wound Present    Assessment:     Problem List Items Addressed This Visit     None        Procedure Note  Indications:  Based on my examination of this patient's wound(s)/ulcer(s) today, debridement is required to promote healing and evaluate the wound base.     Performed by: Duke Chin, CALLIE Hyman CNP    Consent obtained:  Yes    Time out taken:  Yes    Pain Control: Anesthetic  Anesthetic: 2% Lidocaine Gel Topical

## 2018-10-26 ENCOUNTER — HOSPITAL ENCOUNTER (OUTPATIENT)
Dept: WOUND CARE | Age: 83
Discharge: HOME | End: 2018-10-26
Payer: MEDICARE

## 2018-10-26 VITALS
DIASTOLIC BLOOD PRESSURE: 80 MMHG | SYSTOLIC BLOOD PRESSURE: 122 MMHG | RESPIRATION RATE: 18 BRPM | HEIGHT: 60 IN | HEART RATE: 72 BPM | TEMPERATURE: 97.6 F | WEIGHT: 118 LBS | BODY MASS INDEX: 23.16 KG/M2

## 2018-10-26 DIAGNOSIS — L89.152 DECUBITUS ULCER OF SACRAL REGION, STAGE 2 (HCC): ICD-10-CM

## 2018-10-26 PROCEDURE — 97597 DBRDMT OPN WND 1ST 20 CM/<: CPT | Performed by: NURSE PRACTITIONER

## 2018-10-26 PROCEDURE — 97597 DBRDMT OPN WND 1ST 20 CM/<: CPT

## 2018-10-26 ASSESSMENT — PAIN DESCRIPTION - ONSET: ONSET: ON-GOING

## 2018-10-26 ASSESSMENT — PAIN DESCRIPTION - FREQUENCY: FREQUENCY: CONTINUOUS

## 2018-10-26 ASSESSMENT — PAIN SCALES - GENERAL: PAINLEVEL_OUTOF10: 5

## 2018-10-26 ASSESSMENT — PAIN DESCRIPTION - DESCRIPTORS: DESCRIPTORS: BURNING

## 2018-10-26 ASSESSMENT — PAIN DESCRIPTION - PROGRESSION: CLINICAL_PROGRESSION: NOT CHANGED

## 2018-10-26 ASSESSMENT — PAIN DESCRIPTION - ORIENTATION: ORIENTATION: MID

## 2018-10-26 ASSESSMENT — PAIN DESCRIPTION - PAIN TYPE: TYPE: CHRONIC PAIN

## 2018-10-26 ASSESSMENT — PAIN DESCRIPTION - LOCATION: LOCATION: COCCYX

## 2018-10-26 NOTE — PROGRESS NOTES
taken: Yes    Pain Control: Anesthetic  Anesthetic: 2% Lidocaine Gel Topical     Debridement:Non-excisional Debridement    Using curette the wound(s)/ulcer(s) was/were sharply debrided down through and including the removal of dermis. Devitalized Tissue Debrided:  slough to stimulate bleeding to promote healing, post debridement good bleeding base and wound edges noted    Pre Debridement Measurements:  Are located in the [place]  Documentation Flow Sheet    Wound/Ulcer #: 1    Post Debridement Measurements:  Wound/Ulcer Descriptions are Pre Debridement except measurements:    Wound 03/09/18  Coccyx #1 Stage 3 acq 2/2/18 (Active)   Wound Image   10/26/2018  8:19 AM   Wound Type Wound 3/9/2018 10:02 AM   Wound Pressure Stage  3 3/9/2018 10:02 AM   Dressing Status Clean;Dry; Intact 8/24/2018  9:24 AM   Dressing Changed Changed/New 10/5/2018  9:09 AM   Dressing/Treatment Alginate;Dry dressing 10/5/2018  9:09 AM   Wound Cleansed Wound cleanser 10/26/2018  8:19 AM   Dressing Change Due 10/06/18 10/5/2018  9:09 AM   Wound Length (cm) 1.6 cm 10/26/2018  8:36 AM   Wound Width (cm) 0.4 cm 10/26/2018  8:36 AM   Wound Depth (cm)  1 10/26/2018  8:36 AM   Calculated Wound Size (cm^2) (l*w) 0.64 cm^2 10/26/2018  8:36 AM   Change in Wound Size % (l*w) 89.59 10/26/2018  8:36 AM   Distance Tunneling (cm) 0.2 cm 10/5/2018  7:59 AM   Tunneling Position ___ O'Clock 12 10/5/2018  7:59 AM   Undermining Starts ___ O'Clock 9 9/14/2018  8:22 AM   Undermining Ends___ O'Clock 11 9/14/2018  8:22 AM   Undermining Maxium Distance (cm) 0.3 9/14/2018  8:22 AM   Wound Assessment Red 10/26/2018  8:19 AM   Drainage Amount Moderate 10/26/2018  8:19 AM   Drainage Description Yellow 10/26/2018  8:19 AM   Odor None 10/26/2018  8:19 AM   Divya-wound Assessment Excoriated 10/26/2018  8:19 AM   Yellow%Wound Bed 100 3/23/2018 10:36 AM   Time out Yes 10/26/2018  8:36 AM   Procedural Pain 0 10/26/2018  8:36 AM   Post procedural Pain 0 10/26/2018  8:36 AM   Number of days: 230       Incision 02/07/18 Hip Right (Active)   Number of days: 260     Percent of Wound/Ulcer Debrided: 100%    Total Surface Area Debrided:  0.6 sq cm     Estimated Blood Loss:  Minimal  Hemostasis Achieved:  by pressure    Procedural Pain:  2  / 10   Post Procedural Pain:  0 / 10     Response to treatment:  Well tolerated by patient. Plan:   Treatment Note please see attached Discharge Instructions    Written patient dismissal instructions given to patient and signed by patient or POA. Discharge Instructions       Visit Discharge/Physician Orders     Discharge condition: Stable     Assessment of pain at discharge: NONE      Anesthetic used:  2% LIDOCAINE GEL     Discharge to: ECF      Left via:ambulance     Accompanied by: family      [facility]      Dressing Orders:  COCCYX:   CLEANSE WOUND WITH NORMAL STERILE SALINE. Pack lightly with AQUACEL THEN APPLY dry dressing  And secure. Change daily. AND AS NEEDED. MAKE SURE TO LIGHTLY PACK AQUACEL IN UNDERMINING area. Please order a mepilex sacryl bordered gauze or equivalent to protect entire coccyx/sacryl area because it is starting to break down and become excoriated. May use zinc or other barrier protection cream to rosario wound with dressing changes.     MAINTAIN KOEHLER CATH UNTIL STAGE 3 PRESSURE ULCER IS HEALED      Treatment Orders:  FOLLOW NUTRITIOUS DIET. CHOOSE FOODS HIGH IN PROTEIN -CHICKEN- FISH-AND EGGS,  CHOOSE FOODS HIGH IN VITAMIN C.   MULTIVITAMIN DAILY.       LOW AIR LOSS MATTRESS  BED.       REPOSITION EVERY 1-2 HOURS. KEEP PRESSURE OFF WOUNDS. - SIDE TO SIDE AS MUCH AS POSSIBLE      NOT TO SIT FOR PROLONGED PERIOD OF TIMES.      [facility] followup visit 3 WEEKS ______________________  (Please note your next appointment above and if you are unable to keep, kindly give a 24 hour notice.  Thank you.)     Physician signature:__________________________        If you experience any of the following, please call the [facility] during business hours:     * Increase in Pain  * Temperature over 101  * Increase in drainage from your wound  * Drainage with a foul odor  * Bleeding  * Increase in swelling  * Need for compression bandage changes due to slippage, breakthrough drainage.     If you need medical attention outside of the business hours of the [facility] please contact your PCP or go to the nearest emergency room.         Electronically signed by CALLIE Naqvi CNP on 10/26/2018 at 8:48 AM

## 2018-11-16 ENCOUNTER — HOSPITAL ENCOUNTER (OUTPATIENT)
Dept: WOUND CARE | Age: 83
Discharge: HOME | End: 2018-11-16
Payer: MEDICARE

## 2018-11-16 VITALS
DIASTOLIC BLOOD PRESSURE: 64 MMHG | RESPIRATION RATE: 18 BRPM | SYSTOLIC BLOOD PRESSURE: 100 MMHG | HEART RATE: 72 BPM | BODY MASS INDEX: 23.05 KG/M2 | TEMPERATURE: 97.9 F | WEIGHT: 118 LBS

## 2018-11-16 DIAGNOSIS — L89.323 DECUBITUS ULCER OF LEFT BUTTOCK, STAGE 3 (HCC): ICD-10-CM

## 2018-11-16 PROCEDURE — 11042 DBRDMT SUBQ TIS 1ST 20SQCM/<: CPT | Performed by: NURSE PRACTITIONER

## 2018-11-16 PROCEDURE — 11042 DBRDMT SUBQ TIS 1ST 20SQCM/<: CPT

## 2018-11-16 NOTE — PROGRESS NOTES
Encounter   Medication Sig Dispense Refill    acetaminophen (TYLENOL) 325 MG tablet Take 2 tablets by mouth every 4 hours as needed for Fever 120 tablet 3    amiodarone (CORDARONE) 200 MG tablet Take 1 tablet by mouth 2 times daily 30 tablet 3    ondansetron (ZOFRAN-ODT) 4 MG disintegrating tablet Take 1 tablet by mouth every 8 hours as needed for Nausea or Vomiting      docusate sodium (COLACE, DULCOLAX) 100 MG CAPS Take 100 mg by mouth daily      sennosides-docusate sodium (SENOKOT-S) 8.6-50 MG tablet Take 1 tablet by mouth daily      benzocaine-menthol (CEPACOL SORE THROAT) 15-3.6 MG lozenge Take 1 lozenge by mouth 4 times daily as needed for Sore Throat 168 lozenge     famotidine (PEPCID) 10 MG tablet Take 1 tablet by mouth daily 60 tablet 3    aspirin 81 MG chewable tablet Take 1 tablet by mouth daily 30 tablet 3    nitroGLYCERIN (NITROSTAT) 0.4 MG SL tablet up to max of 3 total doses. If no relief after 1 dose, call 911. 25 tablet 3    clopidogrel (PLAVIX) 75 MG tablet Take 1 tablet by mouth daily 30 tablet 3     No current facility-administered medications on file prior to encounter. REVIEW OF SYSTEMS See HPI    Objective:    /64   Pulse 72   Temp 97.9 °F (36.6 °C) (Oral)   Resp 18   Wt 118 lb (53.5 kg)   BMI 23.05 kg/m²   Wt Readings from Last 3 Encounters:   11/16/18 118 lb (53.5 kg)   10/26/18 118 lb (53.5 kg)   08/24/18 118 lb (53.5 kg)     PHYSICAL EXAM  CONSTITUTIONAL:   Awake, alert, cooperative   EYES:  lids and lashes normal   ENT: external ears and nose without lesions   NECK:  supple, symmetrical, trachea midline   SKIN:  Open wound Present    Assessment:     Problem List Items Addressed This Visit     Decubitus ulcer of left buttock, stage 3 (HCC)        Procedure Note  Indications:  Based on my examination of this patient's wound(s)/ulcer(s) today, debridement is required to promote healing and evaluate the wound base.     Performed by: Paola Skiff, APRN - 8:45 AM   Procedural Pain 0 11/16/2018  8:45 AM   Post procedural Pain 0 11/16/2018  8:45 AM   Number of days: 251       Wound 11/16/18  Buttocks Left #2 acq 11/10/18 (Active)   Wound Image   11/16/2018  8:26 AM   Wound Type Wound 11/16/2018  8:26 AM   Wound Pressure Stage  3 11/16/2018  8:26 AM   Wound Length (cm) 9 cm 11/16/2018  8:45 AM   Wound Width (cm) 7 cm 11/16/2018  8:45 AM   Wound Depth (cm)  0.3 11/16/2018  8:45 AM   Calculated Wound Size (cm^2) (l*w) 63 cm^2 11/16/2018  8:45 AM   Change in Wound Size % (l*w) 0 11/16/2018  8:45 AM   Wound Assessment Pink;Red;Yellow 11/16/2018  8:26 AM   Drainage Amount Moderate 11/16/2018  8:26 AM   Drainage Description Serosanguinous 11/16/2018  8:26 AM   Odor None 11/16/2018  8:26 AM   Divya-wound Assessment Excoriated 11/16/2018  8:26 AM   Time out Yes 11/16/2018  8:45 AM   Procedural Pain 0 11/16/2018  8:45 AM   Post procedural Pain 0 11/16/2018  8:45 AM   Number of days: 0       Incision 02/07/18 Hip Right (Active)   Number of days: 281     Percent of Wound/Ulcer Debrided: 20%    Total Surface Area Debrided:  15 sq cm     Estimated Blood Loss:  Minimal  Hemostasis Achieved:  by pressure    Procedural Pain:  5  / 10   Post Procedural Pain:  3 / 10     Response to treatment:  With complaints of pain. Plan:   Treatment Note please see attached Discharge Instructions    Written patient dismissal instructions given to patient and signed by patient or POA. Discharge Instructions       Visit Discharge/Physician Orders     Discharge condition: Stable     Assessment of pain at discharge: NONE      Anesthetic used:  2% LIDOCAINE GEL     Discharge to: ECF      Left via:ambulance     Accompanied by: family      [facility]:  [facility]      Dressing Orders:  COCCYX:   CLEANSE WOUND WITH NORMAL STERILE SALINE.  Pack lightly with AQUACEL THEN APPLY zinc to b/l buttocks and cover with ABD pad with little to no tape covering the entire coccyx area, change ABD pad

## 2018-11-30 ENCOUNTER — HOSPITAL ENCOUNTER (OUTPATIENT)
Dept: WOUND CARE | Age: 83
Discharge: HOME | End: 2018-11-30
Payer: MEDICARE

## 2018-11-30 VITALS
SYSTOLIC BLOOD PRESSURE: 102 MMHG | DIASTOLIC BLOOD PRESSURE: 62 MMHG | WEIGHT: 118 LBS | RESPIRATION RATE: 18 BRPM | TEMPERATURE: 98.2 F | BODY MASS INDEX: 23.05 KG/M2 | HEART RATE: 72 BPM

## 2018-11-30 PROCEDURE — 11042 DBRDMT SUBQ TIS 1ST 20SQCM/<: CPT

## 2018-11-30 PROCEDURE — 11042 DBRDMT SUBQ TIS 1ST 20SQCM/<: CPT | Performed by: NURSE PRACTITIONER

## 2018-11-30 NOTE — PROGRESS NOTES
Encounter   Medication Sig Dispense Refill    acetaminophen (TYLENOL) 325 MG tablet Take 2 tablets by mouth every 4 hours as needed for Fever 120 tablet 3    amiodarone (CORDARONE) 200 MG tablet Take 1 tablet by mouth 2 times daily 30 tablet 3    ondansetron (ZOFRAN-ODT) 4 MG disintegrating tablet Take 1 tablet by mouth every 8 hours as needed for Nausea or Vomiting      docusate sodium (COLACE, DULCOLAX) 100 MG CAPS Take 100 mg by mouth daily      sennosides-docusate sodium (SENOKOT-S) 8.6-50 MG tablet Take 1 tablet by mouth daily      benzocaine-menthol (CEPACOL SORE THROAT) 15-3.6 MG lozenge Take 1 lozenge by mouth 4 times daily as needed for Sore Throat 168 lozenge     famotidine (PEPCID) 10 MG tablet Take 1 tablet by mouth daily 60 tablet 3    aspirin 81 MG chewable tablet Take 1 tablet by mouth daily 30 tablet 3    nitroGLYCERIN (NITROSTAT) 0.4 MG SL tablet up to max of 3 total doses. If no relief after 1 dose, call 911. 25 tablet 3    clopidogrel (PLAVIX) 75 MG tablet Take 1 tablet by mouth daily 30 tablet 3     No current facility-administered medications on file prior to encounter. REVIEW OF SYSTEMS See HPI    Objective:    /62   Pulse 72   Temp 98.2 °F (36.8 °C) (Oral)   Resp 18   Wt 118 lb (53.5 kg)   BMI 23.05 kg/m²   Wt Readings from Last 3 Encounters:   11/30/18 118 lb (53.5 kg)   11/16/18 118 lb (53.5 kg)   10/26/18 118 lb (53.5 kg)     PHYSICAL EXAM  CONSTITUTIONAL:   Awake, alert, cooperative   EYES:  lids and lashes normal   ENT: external ears and nose without lesions   NECK:  supple, symmetrical, trachea midline   SKIN:  Open wound Present    Assessment:     Problem List Items Addressed This Visit     None        Procedure Note  Indications:  Based on my examination of this patient's wound(s)/ulcer(s) today, debridement is required to promote healing and evaluate the wound base.     Performed by: Mirta Romberg, APRN - CNP    Consent obtained:  Yes    Time out taken:

## 2018-12-14 ENCOUNTER — HOSPITAL ENCOUNTER (OUTPATIENT)
Dept: WOUND CARE | Age: 83
Discharge: HOME | End: 2018-12-14
Payer: MEDICARE

## 2018-12-14 VITALS
WEIGHT: 118 LBS | RESPIRATION RATE: 18 BRPM | SYSTOLIC BLOOD PRESSURE: 108 MMHG | HEART RATE: 82 BPM | TEMPERATURE: 98 F | HEIGHT: 60 IN | DIASTOLIC BLOOD PRESSURE: 62 MMHG | BODY MASS INDEX: 23.16 KG/M2

## 2018-12-14 PROBLEM — L89.323 DECUBITUS ULCER OF LEFT BUTTOCK, STAGE 3 (HCC): Status: RESOLVED | Noted: 2018-11-16 | Resolved: 2018-12-14

## 2018-12-14 PROCEDURE — 11042 DBRDMT SUBQ TIS 1ST 20SQCM/<: CPT | Performed by: NURSE PRACTITIONER

## 2018-12-14 PROCEDURE — 11042 DBRDMT SUBQ TIS 1ST 20SQCM/<: CPT

## 2018-12-14 NOTE — PROGRESS NOTES
Wound Image   11/30/2018  8:06 AM   Wound Pressure Stage  3 11/30/2018  8:06 AM   Wound Length (cm) 0 cm 12/14/2018  8:29 AM   Wound Width (cm) 0 cm 12/14/2018  8:29 AM   Wound Depth (cm) 0 cm 12/14/2018  8:29 AM   Wound Surface Area (cm^2) 0 cm^2 12/14/2018  8:29 AM   Change in Wound Size % (l*w) 100 12/14/2018  8:29 AM   Wound Volume (cm^3) 0 cm^3 12/14/2018  8:29 AM   Wound Healing % 100 12/14/2018  8:29 AM   Post-Procedure Length (cm) 8.5 cm 11/30/2018  8:52 AM   Post-Procedure Width (cm) 5 cm 11/30/2018  8:52 AM   Post-Procedure Depth (cm) 0.3 cm 11/30/2018  8:52 AM   Post-Procedure Surface Area (cm^2) 42.5 cm^2 11/30/2018  8:52 AM   Post-Procedure Volume (cm^3) 12.75 cm^3 11/30/2018  8:52 AM   Wound Assessment Pink;Red 11/30/2018  8:06 AM   Drainage Amount Moderate 11/30/2018  8:06 AM   Drainage Description Yellow 11/30/2018  8:06 AM   Odor None 11/30/2018  8:06 AM   Divya-wound Assessment Excoriated 11/30/2018  8:06 AM   Number of days: 27     Percent of Wound/Ulcer Debrided: 100%    Total Surface Area Debrided:  0.6 sq cm     Estimated Blood Loss:  Minimal  Hemostasis Achieved:  by pressure    Procedural Pain:  2  / 10   Post Procedural Pain:  0 / 10     Response to treatment:  Well tolerated by patient. Plan:   Treatment Note please see attached Discharge Instructions    Written patient dismissal instructions given to patient and signed by patient or POA. Discharge Instructions       Visit Discharge/Physician Orders     Discharge condition: Stable     Assessment of pain at discharge: NONE      Anesthetic used:  2% LIDOCAINE GEL     Discharge to: ECF      Left via:ambulance     Accompanied by: family      [facility]:  [facility]      Dressing Orders:  COCCYX:   CLEANSE WOUND WITH NORMAL STERILE SALINE.  Pack lightly with AQUACEL and BEGIN TO APPLY TRIAMCINOLONE 0.1% CREAM to b/l buttocks and cover with ABD pad with little to no tape covering the entire coccyx area, change ABD pad twice daily and as needed. Eb Perdue is having bladder spasms and leaking urine, please make sure she is frequently checked and changed.  The buttocks is very excoriated from tape and moisture.     MAINTAIN KOEHLER CATH UNTIL STAGE 3 PRESSURE ULCER IS HEALED      Treatment Orders:  FOLLOW NUTRITIOUS DIET. CHOOSE FOODS HIGH IN PROTEIN -CHICKEN- FISH-AND EGGS,  CHOOSE FOODS HIGH IN VITAMIN C.   MULTIVITAMIN DAILY.       LOW AIR LOSS MATTRESS  BED.       REPOSITION EVERY 1-2 HOURS. KEEP PRESSURE OFF WOUNDS. - SIDE TO SIDE AS MUCH AS POSSIBLE      NOT TO SIT FOR PROLONGED PERIOD OF TIMES.      [facility] followup visit _____4 WEEKS ______________________  (Please note your next appointment above and if you are unable to keep, kindly give a 24 hour notice.  Thank you.)     Physician signature:__________________________        If you experience any of the following, please call the [facility] during business hours:     * Increase in Pain  * Temperature over 101  * Increase in drainage from your wound  * Drainage with a foul odor  * Bleeding  * Increase in swelling  * Need for compression bandage changes due to slippage, breakthrough drainage.     If you need medical attention outside of the business hours of the [facility] please contact your PCP or go to the nearest emergency room.           Electronically signed by CALLIE Adam CNP on 12/14/2018 at 9:12 AM

## 2019-01-01 ENCOUNTER — HOSPITAL ENCOUNTER (EMERGENCY)
Age: 84
Discharge: HOME | End: 2019-09-21
Attending: EMERGENCY MEDICINE
Payer: MEDICARE

## 2019-01-01 ENCOUNTER — HOSPITAL ENCOUNTER (EMERGENCY)
Age: 84
Discharge: HOME | End: 2019-12-15
Attending: EMERGENCY MEDICINE
Payer: MEDICARE

## 2019-01-01 ENCOUNTER — HOSPITAL ENCOUNTER (OUTPATIENT)
Dept: WOUND CARE | Age: 84
Discharge: HOME | End: 2019-06-06
Payer: MEDICARE

## 2019-01-01 ENCOUNTER — HOSPITAL ENCOUNTER (OUTPATIENT)
Dept: WOUND CARE | Age: 84
Discharge: HOME | End: 2019-07-17
Payer: MEDICARE

## 2019-01-01 ENCOUNTER — HOSPITAL ENCOUNTER (INPATIENT)
Age: 84
LOS: 3 days | Discharge: NURSING, REHAB OR LONG-TERM CARE | DRG: 393 | End: 2019-01-04
Attending: EMERGENCY MEDICINE | Admitting: INTERNAL MEDICINE
Payer: MEDICARE

## 2019-01-01 ENCOUNTER — HOSPITAL ENCOUNTER (OUTPATIENT)
Dept: WOUND CARE | Age: 84
Discharge: HOME | End: 2019-06-26
Payer: MEDICARE

## 2019-01-01 ENCOUNTER — HOSPITAL ENCOUNTER (OUTPATIENT)
Dept: WOUND CARE | Age: 84
Discharge: HOME | End: 2019-04-12
Payer: MEDICARE

## 2019-01-01 ENCOUNTER — HOSPITAL ENCOUNTER (OUTPATIENT)
Dept: WOUND CARE | Age: 84
Discharge: HOME | End: 2019-09-25
Payer: MEDICARE

## 2019-01-01 ENCOUNTER — HOSPITAL ENCOUNTER (OUTPATIENT)
Dept: WOUND CARE | Age: 84
Discharge: HOME | End: 2019-05-03
Payer: MEDICARE

## 2019-01-01 ENCOUNTER — HOSPITAL ENCOUNTER (OUTPATIENT)
Dept: WOUND CARE | Age: 84
Discharge: HOME | End: 2019-08-21
Payer: MEDICARE

## 2019-01-01 VITALS
OXYGEN SATURATION: 96 % | DIASTOLIC BLOOD PRESSURE: 74 MMHG | SYSTOLIC BLOOD PRESSURE: 132 MMHG | HEART RATE: 78 BPM | TEMPERATURE: 98.3 F | RESPIRATION RATE: 16 BRPM

## 2019-01-01 VITALS
WEIGHT: 120 LBS | BODY MASS INDEX: 23.56 KG/M2 | RESPIRATION RATE: 18 BRPM | TEMPERATURE: 97.7 F | DIASTOLIC BLOOD PRESSURE: 60 MMHG | HEIGHT: 60 IN | HEART RATE: 74 BPM | SYSTOLIC BLOOD PRESSURE: 98 MMHG

## 2019-01-01 VITALS
HEIGHT: 60 IN | TEMPERATURE: 97.5 F | HEART RATE: 72 BPM | WEIGHT: 120 LBS | RESPIRATION RATE: 16 BRPM | SYSTOLIC BLOOD PRESSURE: 130 MMHG | DIASTOLIC BLOOD PRESSURE: 84 MMHG | BODY MASS INDEX: 23.56 KG/M2

## 2019-01-01 VITALS
TEMPERATURE: 98.7 F | RESPIRATION RATE: 16 BRPM | WEIGHT: 120 LBS | SYSTOLIC BLOOD PRESSURE: 100 MMHG | HEIGHT: 60 IN | DIASTOLIC BLOOD PRESSURE: 62 MMHG | BODY MASS INDEX: 23.56 KG/M2 | HEART RATE: 67 BPM

## 2019-01-01 VITALS
HEART RATE: 112 BPM | DIASTOLIC BLOOD PRESSURE: 62 MMHG | OXYGEN SATURATION: 96 % | SYSTOLIC BLOOD PRESSURE: 146 MMHG | RESPIRATION RATE: 16 BRPM | TEMPERATURE: 98.3 F

## 2019-01-01 VITALS
WEIGHT: 120 LBS | RESPIRATION RATE: 18 BRPM | SYSTOLIC BLOOD PRESSURE: 112 MMHG | HEART RATE: 64 BPM | TEMPERATURE: 97.7 F | BODY MASS INDEX: 23.44 KG/M2 | DIASTOLIC BLOOD PRESSURE: 60 MMHG

## 2019-01-01 VITALS
DIASTOLIC BLOOD PRESSURE: 74 MMHG | SYSTOLIC BLOOD PRESSURE: 120 MMHG | TEMPERATURE: 98.1 F | RESPIRATION RATE: 16 BRPM | HEART RATE: 76 BPM

## 2019-01-01 VITALS
WEIGHT: 120 LBS | SYSTOLIC BLOOD PRESSURE: 112 MMHG | HEIGHT: 60 IN | TEMPERATURE: 98.2 F | RESPIRATION RATE: 16 BRPM | DIASTOLIC BLOOD PRESSURE: 82 MMHG | HEART RATE: 82 BPM | BODY MASS INDEX: 23.56 KG/M2

## 2019-01-01 DIAGNOSIS — N17.9 AKI (ACUTE KIDNEY INJURY) (HCC): ICD-10-CM

## 2019-01-01 DIAGNOSIS — L89.153 DECUBITUS ULCER OF SACRAL REGION, STAGE 3 (HCC): ICD-10-CM

## 2019-01-01 DIAGNOSIS — T83.010A SUPRAPUBIC CATHETER DYSFUNCTION, INITIAL ENCOUNTER (HCC): Primary | ICD-10-CM

## 2019-01-01 DIAGNOSIS — E87.20 LACTIC ACIDOSIS: ICD-10-CM

## 2019-01-01 DIAGNOSIS — K92.2 GASTROINTESTINAL HEMORRHAGE, UNSPECIFIED GASTROINTESTINAL HEMORRHAGE TYPE: Primary | ICD-10-CM

## 2019-01-01 PROBLEM — K62.5 BRBPR (BRIGHT RED BLOOD PER RECTUM): Status: ACTIVE | Noted: 2019-01-01

## 2019-01-01 PROBLEM — I48.0 PAF (PAROXYSMAL ATRIAL FIBRILLATION) (HCC): Status: ACTIVE | Noted: 2019-01-01

## 2019-01-01 PROBLEM — J44.9 COPD (CHRONIC OBSTRUCTIVE PULMONARY DISEASE) (HCC): Status: ACTIVE | Noted: 2018-01-01

## 2019-01-01 PROBLEM — E03.2 HYPOTHYROIDISM DUE TO MEDICATION: Status: ACTIVE | Noted: 2019-01-01

## 2019-01-01 LAB
ABO/RH: NORMAL
ALBUMIN SERPL-MCNC: 4 G/DL (ref 3.5–5.2)
ALP BLD-CCNC: 68 U/L (ref 35–104)
ALT SERPL-CCNC: 21 U/L (ref 0–32)
ANION GAP SERPL CALCULATED.3IONS-SCNC: 14 MMOL/L (ref 7–16)
ANION GAP SERPL CALCULATED.3IONS-SCNC: 16 MMOL/L (ref 7–16)
ANTIBODY SCREEN: NORMAL
APTT: 36.2 SEC (ref 24.5–35.1)
AST SERPL-CCNC: 31 U/L (ref 0–31)
BASOPHILS ABSOLUTE: 0.04 E9/L (ref 0–0.2)
BASOPHILS RELATIVE PERCENT: 0.3 % (ref 0–2)
BILIRUB SERPL-MCNC: 0.4 MG/DL (ref 0–1.2)
BUN BLDV-MCNC: 41 MG/DL (ref 8–23)
BUN BLDV-MCNC: 43 MG/DL (ref 8–23)
C DIFFICILE TOXIN, EIA: NORMAL
CALCIUM SERPL-MCNC: 8.1 MG/DL (ref 8.6–10.2)
CALCIUM SERPL-MCNC: 8.8 MG/DL (ref 8.6–10.2)
CHLORIDE BLD-SCNC: 101 MMOL/L (ref 98–107)
CHLORIDE BLD-SCNC: 96 MMOL/L (ref 98–107)
CHP ED QC CHECK: YES
CO2: 21 MMOL/L (ref 22–29)
CO2: 22 MMOL/L (ref 22–29)
CREAT SERPL-MCNC: 1.3 MG/DL (ref 0.5–1)
CREAT SERPL-MCNC: 1.5 MG/DL (ref 0.5–1)
EOSINOPHILS ABSOLUTE: 0.04 E9/L (ref 0.05–0.5)
EOSINOPHILS RELATIVE PERCENT: 0.3 % (ref 0–6)
FOLATE: >20 NG/ML (ref 4.8–24.2)
GFR AFRICAN AMERICAN: 39
GFR AFRICAN AMERICAN: 46
GFR NON-AFRICAN AMERICAN: 32 ML/MIN/1.73
GFR NON-AFRICAN AMERICAN: 38 ML/MIN/1.73
GLUCOSE BLD-MCNC: 138 MG/DL (ref 74–99)
GLUCOSE BLD-MCNC: 145 MG/DL (ref 74–99)
HCT VFR BLD CALC: 35.2 % (ref 34–48)
HCT VFR BLD CALC: 35.4 % (ref 34–48)
HCT VFR BLD CALC: 42.5 % (ref 34–48)
HEMOCCULT STL QL: POSITIVE
HEMOGLOBIN: 11.4 G/DL (ref 11.5–15.5)
HEMOGLOBIN: 11.6 G/DL (ref 11.5–15.5)
HEMOGLOBIN: 13.4 G/DL (ref 11.5–15.5)
IMMATURE GRANULOCYTES #: 0.06 E9/L
IMMATURE GRANULOCYTES %: 0.4 % (ref 0–5)
INR BLD: 1.3
LACTIC ACID, SEPSIS: 1 MMOL/L (ref 0.5–1.9)
LACTIC ACID: 3.5 MMOL/L (ref 0.5–2.2)
LIPASE: 79 U/L (ref 13–60)
LYMPHOCYTES ABSOLUTE: 0.48 E9/L (ref 1.5–4)
LYMPHOCYTES RELATIVE PERCENT: 3.4 % (ref 20–42)
MAGNESIUM: 2.2 MG/DL (ref 1.6–2.6)
MCH RBC QN AUTO: 32.4 PG (ref 26–35)
MCHC RBC AUTO-ENTMCNC: 31.5 % (ref 32–34.5)
MCV RBC AUTO: 102.7 FL (ref 80–99.9)
MONOCYTES ABSOLUTE: 0.86 E9/L (ref 0.1–0.95)
MONOCYTES RELATIVE PERCENT: 6.1 % (ref 2–12)
NEUTROPHILS ABSOLUTE: 12.72 E9/L (ref 1.8–7.3)
NEUTROPHILS RELATIVE PERCENT: 89.5 % (ref 43–80)
PDW BLD-RTO: 14.7 FL (ref 11.5–15)
PHOSPHORUS: 3.5 MG/DL (ref 2.5–4.5)
PLATELET # BLD: 352 E9/L (ref 130–450)
PMV BLD AUTO: 9.4 FL (ref 7–12)
POTASSIUM SERPL-SCNC: 4.7 MMOL/L (ref 3.5–5)
POTASSIUM SERPL-SCNC: 4.7 MMOL/L (ref 3.5–5)
PROCALCITONIN: 1.27 NG/ML (ref 0–0.08)
PROTHROMBIN TIME: 14.7 SEC (ref 9.3–12.4)
RBC # BLD: 4.14 E12/L (ref 3.5–5.5)
RBC # BLD: NORMAL 10*6/UL
SODIUM BLD-SCNC: 134 MMOL/L (ref 132–146)
SODIUM BLD-SCNC: 136 MMOL/L (ref 132–146)
TOTAL PROTEIN: 7.6 G/DL (ref 6.4–8.3)
TSH SERPL DL<=0.05 MIU/L-ACNC: 88.76 UIU/ML (ref 0.27–4.2)
VITAMIN B-12: 590 PG/ML (ref 211–946)
WBC # BLD: 14.2 E9/L (ref 4.5–11.5)

## 2019-01-01 PROCEDURE — 82746 ASSAY OF FOLIC ACID SERUM: CPT

## 2019-01-01 PROCEDURE — 87045 FECES CULTURE AEROBIC BACT: CPT

## 2019-01-01 PROCEDURE — 84145 PROCALCITONIN (PCT): CPT

## 2019-01-01 PROCEDURE — 80048 BASIC METABOLIC PNL TOTAL CA: CPT

## 2019-01-01 PROCEDURE — 11042 DBRDMT SUBQ TIS 1ST 20SQCM/<: CPT | Performed by: NURSE PRACTITIONER

## 2019-01-01 PROCEDURE — 2580000003 HC RX 258: Performed by: INTERNAL MEDICINE

## 2019-01-01 PROCEDURE — 84100 ASSAY OF PHOSPHORUS: CPT

## 2019-01-01 PROCEDURE — 11042 DBRDMT SUBQ TIS 1ST 20SQCM/<: CPT | Performed by: SURGERY

## 2019-01-01 PROCEDURE — 36415 COLL VENOUS BLD VENIPUNCTURE: CPT

## 2019-01-01 PROCEDURE — 83690 ASSAY OF LIPASE: CPT

## 2019-01-01 PROCEDURE — 87040 BLOOD CULTURE FOR BACTERIA: CPT

## 2019-01-01 PROCEDURE — 6370000000 HC RX 637 (ALT 250 FOR IP): Performed by: INTERNAL MEDICINE

## 2019-01-01 PROCEDURE — 87088 URINE BACTERIA CULTURE: CPT

## 2019-01-01 PROCEDURE — 86900 BLOOD TYPING SEROLOGIC ABO: CPT

## 2019-01-01 PROCEDURE — 87324 CLOSTRIDIUM AG IA: CPT

## 2019-01-01 PROCEDURE — 85025 COMPLETE CBC W/AUTO DIFF WBC: CPT

## 2019-01-01 PROCEDURE — 99285 EMERGENCY DEPT VISIT HI MDM: CPT

## 2019-01-01 PROCEDURE — 86901 BLOOD TYPING SEROLOGIC RH(D): CPT

## 2019-01-01 PROCEDURE — 2580000003 HC RX 258: Performed by: EMERGENCY MEDICINE

## 2019-01-01 PROCEDURE — 85014 HEMATOCRIT: CPT

## 2019-01-01 PROCEDURE — 11042 DBRDMT SUBQ TIS 1ST 20SQCM/<: CPT

## 2019-01-01 PROCEDURE — 82607 VITAMIN B-12: CPT

## 2019-01-01 PROCEDURE — 83735 ASSAY OF MAGNESIUM: CPT

## 2019-01-01 PROCEDURE — 84443 ASSAY THYROID STIM HORMONE: CPT

## 2019-01-01 PROCEDURE — 87077 CULTURE AEROBIC IDENTIFY: CPT

## 2019-01-01 PROCEDURE — 2060000000 HC ICU INTERMEDIATE R&B

## 2019-01-01 PROCEDURE — 80053 COMPREHEN METABOLIC PANEL: CPT

## 2019-01-01 PROCEDURE — 86850 RBC ANTIBODY SCREEN: CPT

## 2019-01-01 PROCEDURE — 85018 HEMOGLOBIN: CPT

## 2019-01-01 PROCEDURE — 51701 INSERT BLADDER CATHETER: CPT

## 2019-01-01 PROCEDURE — 6370000000 HC RX 637 (ALT 250 FOR IP): Performed by: EMERGENCY MEDICINE

## 2019-01-01 PROCEDURE — 99223 1ST HOSP IP/OBS HIGH 75: CPT | Performed by: PHYSICIAN ASSISTANT

## 2019-01-01 PROCEDURE — 83605 ASSAY OF LACTIC ACID: CPT

## 2019-01-01 PROCEDURE — 99283 EMERGENCY DEPT VISIT LOW MDM: CPT

## 2019-01-01 PROCEDURE — 51702 INSERT TEMP BLADDER CATH: CPT

## 2019-01-01 PROCEDURE — 87186 SC STD MICRODIL/AGAR DIL: CPT

## 2019-01-01 PROCEDURE — 85610 PROTHROMBIN TIME: CPT

## 2019-01-01 PROCEDURE — 85730 THROMBOPLASTIN TIME PARTIAL: CPT

## 2019-01-01 RX ORDER — SODIUM CHLORIDE 0.9 % (FLUSH) 0.9 %
10 SYRINGE (ML) INJECTION EVERY 12 HOURS SCHEDULED
Status: DISCONTINUED | OUTPATIENT
Start: 2019-01-01 | End: 2019-01-04 | Stop reason: HOSPADM

## 2019-01-01 RX ORDER — METOCLOPRAMIDE 5 MG/1
2.5 TABLET ORAL
Status: DISCONTINUED | OUTPATIENT
Start: 2019-01-01 | End: 2019-01-03

## 2019-01-01 RX ORDER — DIPHENHYDRAMINE HCL 25 MG
25 TABLET ORAL EVERY 6 HOURS PRN
Status: DISCONTINUED | OUTPATIENT
Start: 2019-01-01 | End: 2019-01-04 | Stop reason: HOSPADM

## 2019-01-01 RX ORDER — M-VIT,TX,IRON,MINS/CALC/FOLIC 27MG-0.4MG
1 TABLET ORAL DAILY
COMMUNITY

## 2019-01-01 RX ORDER — SODIUM CHLORIDE 0.9 % (FLUSH) 0.9 %
10 SYRINGE (ML) INJECTION PRN
Status: DISCONTINUED | OUTPATIENT
Start: 2019-01-01 | End: 2019-01-04 | Stop reason: HOSPADM

## 2019-01-01 RX ORDER — LIDOCAINE HYDROCHLORIDE 20 MG/ML
JELLY TOPICAL ONCE
Status: DISCONTINUED | OUTPATIENT
Start: 2019-01-01 | End: 2019-01-01 | Stop reason: HOSPADM

## 2019-01-01 RX ORDER — ONDANSETRON 2 MG/ML
4 INJECTION INTRAMUSCULAR; INTRAVENOUS EVERY 6 HOURS PRN
Status: DISCONTINUED | OUTPATIENT
Start: 2019-01-01 | End: 2019-01-04 | Stop reason: HOSPADM

## 2019-01-01 RX ORDER — SODIUM PHOSPHATE, DIBASIC AND SODIUM PHOSPHATE, MONOBASIC 7; 19 G/133ML; G/133ML
1 ENEMA RECTAL PRN
COMMUNITY

## 2019-01-01 RX ORDER — BUMETANIDE 0.5 MG/1
0.5 TABLET ORAL EVERY OTHER DAY
COMMUNITY

## 2019-01-01 RX ORDER — BUMETANIDE 0.5 MG/1
0.5 TABLET ORAL EVERY OTHER DAY
Status: DISCONTINUED | OUTPATIENT
Start: 2019-01-02 | End: 2019-01-04 | Stop reason: HOSPADM

## 2019-01-01 RX ORDER — SODIUM CHLORIDE 0.9 % (FLUSH) 0.9 %
10 SYRINGE (ML) INJECTION PRN
Status: DISCONTINUED | OUTPATIENT
Start: 2019-01-01 | End: 2019-01-01 | Stop reason: SDUPTHER

## 2019-01-01 RX ORDER — ONDANSETRON 4 MG/1
4 TABLET, ORALLY DISINTEGRATING ORAL EVERY 8 HOURS PRN
Status: DISCONTINUED | OUTPATIENT
Start: 2019-01-01 | End: 2019-01-01

## 2019-01-01 RX ORDER — CETIRIZINE HYDROCHLORIDE 10 MG/1
5 TABLET ORAL DAILY
Status: DISCONTINUED | OUTPATIENT
Start: 2019-01-01 | End: 2019-01-04 | Stop reason: HOSPADM

## 2019-01-01 RX ORDER — ENALAPRIL MALEATE 5 MG/1
5 TABLET ORAL DAILY
COMMUNITY

## 2019-01-01 RX ORDER — LEVOTHYROXINE SODIUM 0.05 MG/1
50 TABLET ORAL DAILY
Status: DISCONTINUED | OUTPATIENT
Start: 2019-01-01 | End: 2019-01-04 | Stop reason: HOSPADM

## 2019-01-01 RX ORDER — LOPERAMIDE HYDROCHLORIDE 2 MG/1
2 CAPSULE ORAL 4 TIMES DAILY PRN
Status: DISCONTINUED | OUTPATIENT
Start: 2019-01-01 | End: 2019-01-04 | Stop reason: HOSPADM

## 2019-01-01 RX ORDER — LORATADINE 10 MG/1
10 TABLET ORAL DAILY
COMMUNITY

## 2019-01-01 RX ORDER — NITROGLYCERIN 0.4 MG/1
0.4 TABLET SUBLINGUAL EVERY 5 MIN PRN
Status: DISCONTINUED | OUTPATIENT
Start: 2019-01-01 | End: 2019-01-04 | Stop reason: HOSPADM

## 2019-01-01 RX ORDER — M-VIT,TX,IRON,MINS/CALC/FOLIC 27MG-0.4MG
1 TABLET ORAL DAILY
Status: DISCONTINUED | OUTPATIENT
Start: 2019-01-01 | End: 2019-01-04 | Stop reason: HOSPADM

## 2019-01-01 RX ORDER — 0.9 % SODIUM CHLORIDE 0.9 %
500 INTRAVENOUS SOLUTION INTRAVENOUS ONCE
Status: COMPLETED | OUTPATIENT
Start: 2019-01-01 | End: 2019-01-01

## 2019-01-01 RX ORDER — DIPHENHYDRAMINE HYDROCHLORIDE 50 MG/ML
25 INJECTION INTRAMUSCULAR; INTRAVENOUS ONCE
Status: DISCONTINUED | OUTPATIENT
Start: 2019-01-01 | End: 2019-01-01

## 2019-01-01 RX ORDER — FAMOTIDINE 20 MG/1
10 TABLET, FILM COATED ORAL DAILY
Status: DISCONTINUED | OUTPATIENT
Start: 2019-01-01 | End: 2019-01-04 | Stop reason: HOSPADM

## 2019-01-01 RX ORDER — BUMETANIDE 0.5 MG/1
0.5 TABLET ORAL EVERY OTHER DAY
Status: ON HOLD | COMMUNITY
End: 2019-01-03 | Stop reason: HOSPADM

## 2019-01-01 RX ORDER — DIPHENHYDRAMINE HCL 25 MG
25 TABLET ORAL EVERY 6 HOURS PRN
COMMUNITY

## 2019-01-01 RX ORDER — AMIODARONE HYDROCHLORIDE 200 MG/1
100 TABLET ORAL DAILY
Status: DISCONTINUED | OUTPATIENT
Start: 2019-01-01 | End: 2019-01-04 | Stop reason: HOSPADM

## 2019-01-01 RX ORDER — ACETAMINOPHEN 325 MG/1
650 TABLET ORAL EVERY 4 HOURS PRN
Status: DISCONTINUED | OUTPATIENT
Start: 2019-01-01 | End: 2019-01-04 | Stop reason: HOSPADM

## 2019-01-01 RX ORDER — ACETAMINOPHEN 325 MG/1
650 TABLET ORAL EVERY 4 HOURS PRN
Status: DISCONTINUED | OUTPATIENT
Start: 2019-01-01 | End: 2019-01-01 | Stop reason: SDUPTHER

## 2019-01-01 RX ORDER — SODIUM CHLORIDE 9 MG/ML
INJECTION, SOLUTION INTRAVENOUS CONTINUOUS
Status: DISCONTINUED | OUTPATIENT
Start: 2019-01-01 | End: 2019-01-04 | Stop reason: HOSPADM

## 2019-01-01 RX ADMIN — SERTRALINE HYDROCHLORIDE 50 MG: 50 TABLET ORAL at 14:24

## 2019-01-01 RX ADMIN — CETIRIZINE HYDROCHLORIDE 5 MG: 10 TABLET, FILM COATED ORAL at 11:24

## 2019-01-01 RX ADMIN — FAMOTIDINE 10 MG: 20 TABLET ORAL at 11:23

## 2019-01-01 RX ADMIN — Medication 10 ML: at 05:43

## 2019-01-01 RX ADMIN — ACETAMINOPHEN 650 MG: 325 TABLET ORAL at 21:10

## 2019-01-01 RX ADMIN — SODIUM CHLORIDE 500 ML: 9 INJECTION, SOLUTION INTRAVENOUS at 05:43

## 2019-01-01 RX ADMIN — Medication 1 TABLET: at 11:23

## 2019-01-01 RX ADMIN — LEVOTHYROXINE SODIUM 50 MCG: 50 TABLET ORAL at 14:24

## 2019-01-01 RX ADMIN — AMIODARONE HYDROCHLORIDE 100 MG: 200 TABLET ORAL at 11:25

## 2019-01-01 RX ADMIN — METOCLOPRAMIDE 2.5 MG: 5 TABLET ORAL at 17:15

## 2019-01-01 RX ADMIN — Medication 10 ML: at 11:23

## 2019-01-01 RX ADMIN — SODIUM CHLORIDE: 9 INJECTION, SOLUTION INTRAVENOUS at 09:05

## 2019-01-01 ASSESSMENT — PAIN SCALES - GENERAL
PAINLEVEL_OUTOF10: 10
PAINLEVEL_OUTOF10: 0
PAINLEVEL_OUTOF10: 8
PAINLEVEL_OUTOF10: 4
PAINLEVEL_OUTOF10: 2
PAINLEVEL_OUTOF10: 0

## 2019-01-01 ASSESSMENT — ENCOUNTER SYMPTOMS
NAUSEA: 0
BLOOD IN STOOL: 1
BACK PAIN: 0
ABDOMINAL PAIN: 0
COUGH: 0
SHORTNESS OF BREATH: 0
VOMITING: 0
DIARRHEA: 1
HEMATOCHEZIA: 1

## 2019-01-01 ASSESSMENT — PAIN DESCRIPTION - PROGRESSION: CLINICAL_PROGRESSION: NOT CHANGED

## 2019-01-01 ASSESSMENT — PAIN DESCRIPTION - PAIN TYPE
TYPE: ACUTE PAIN
TYPE: CHRONIC PAIN

## 2019-01-01 ASSESSMENT — PAIN DESCRIPTION - FREQUENCY: FREQUENCY: CONTINUOUS

## 2019-01-01 ASSESSMENT — PAIN DESCRIPTION - LOCATION
LOCATION: COCCYX
LOCATION: ABDOMEN

## 2019-01-01 ASSESSMENT — PAIN DESCRIPTION - DESCRIPTORS
DESCRIPTORS: CRAMPING
DESCRIPTORS: DULL

## 2019-01-01 ASSESSMENT — PAIN DESCRIPTION - ONSET: ONSET: ON-GOING

## 2019-01-02 PROBLEM — R19.7 BLOODY DIARRHEA: Status: ACTIVE | Noted: 2019-01-02

## 2019-01-02 LAB — C DIFFICILE TOXIN, EIA: NORMAL

## 2019-01-02 PROCEDURE — 2700000000 HC OXYGEN THERAPY PER DAY

## 2019-01-02 PROCEDURE — 6370000000 HC RX 637 (ALT 250 FOR IP): Performed by: INTERNAL MEDICINE

## 2019-01-02 PROCEDURE — 99232 SBSQ HOSP IP/OBS MODERATE 35: CPT | Performed by: PHYSICIAN ASSISTANT

## 2019-01-02 PROCEDURE — 97165 OT EVAL LOW COMPLEX 30 MIN: CPT

## 2019-01-02 PROCEDURE — 36415 COLL VENOUS BLD VENIPUNCTURE: CPT

## 2019-01-02 PROCEDURE — 6370000000 HC RX 637 (ALT 250 FOR IP)

## 2019-01-02 PROCEDURE — 97161 PT EVAL LOW COMPLEX 20 MIN: CPT

## 2019-01-02 PROCEDURE — 2580000003 HC RX 258: Performed by: EMERGENCY MEDICINE

## 2019-01-02 PROCEDURE — 2580000003 HC RX 258: Performed by: INTERNAL MEDICINE

## 2019-01-02 PROCEDURE — 87324 CLOSTRIDIUM AG IA: CPT

## 2019-01-02 PROCEDURE — 2060000000 HC ICU INTERMEDIATE R&B

## 2019-01-02 RX ORDER — CLOPIDOGREL BISULFATE 75 MG/1
75 TABLET ORAL DAILY
Status: DISCONTINUED | OUTPATIENT
Start: 2019-01-02 | End: 2019-01-04 | Stop reason: HOSPADM

## 2019-01-02 RX ORDER — DIMETHICONE, OXYBENZONE, AND PADIMATE O 2; 2.5; 6.6 G/100G; G/100G; G/100G
STICK TOPICAL
Status: COMPLETED
Start: 2019-01-02 | End: 2019-01-02

## 2019-01-02 RX ADMIN — MICONAZOLE NITRATE: 2 OINTMENT TOPICAL at 21:19

## 2019-01-02 RX ADMIN — AMIODARONE HYDROCHLORIDE 100 MG: 200 TABLET ORAL at 12:30

## 2019-01-02 RX ADMIN — CLOPIDOGREL BISULFATE 75 MG: 75 TABLET ORAL at 12:31

## 2019-01-02 RX ADMIN — BUMETANIDE 0.5 MG: 0.5 TABLET ORAL at 06:07

## 2019-01-02 RX ADMIN — Medication: at 18:32

## 2019-01-02 RX ADMIN — SODIUM CHLORIDE: 9 INJECTION, SOLUTION INTRAVENOUS at 04:44

## 2019-01-02 RX ADMIN — METOCLOPRAMIDE 2.5 MG: 5 TABLET ORAL at 06:07

## 2019-01-02 RX ADMIN — METOCLOPRAMIDE 2.5 MG: 5 TABLET ORAL at 12:32

## 2019-01-02 RX ADMIN — FAMOTIDINE 10 MG: 20 TABLET ORAL at 12:31

## 2019-01-02 RX ADMIN — Medication 1 TABLET: at 12:30

## 2019-01-02 RX ADMIN — CETIRIZINE HYDROCHLORIDE 5 MG: 10 TABLET, FILM COATED ORAL at 12:33

## 2019-01-02 RX ADMIN — LEVOTHYROXINE SODIUM 50 MCG: 50 TABLET ORAL at 06:07

## 2019-01-02 RX ADMIN — Medication 10 ML: at 12:35

## 2019-01-02 RX ADMIN — METOCLOPRAMIDE 5 MG: 5 TABLET ORAL at 18:32

## 2019-01-02 RX ADMIN — SERTRALINE HYDROCHLORIDE 50 MG: 50 TABLET ORAL at 12:34

## 2019-01-02 ASSESSMENT — PAIN SCALES - GENERAL
PAINLEVEL_OUTOF10: 0

## 2019-01-03 LAB
ANION GAP SERPL CALCULATED.3IONS-SCNC: 6 MMOL/L (ref 7–16)
BASOPHILS ABSOLUTE: 0.06 E9/L (ref 0–0.2)
BASOPHILS RELATIVE PERCENT: 0.5 % (ref 0–2)
BUN BLDV-MCNC: 27 MG/DL (ref 8–23)
CALCIUM SERPL-MCNC: 7.3 MG/DL (ref 8.6–10.2)
CHLORIDE BLD-SCNC: 107 MMOL/L (ref 98–107)
CO2: 21 MMOL/L (ref 22–29)
CREAT SERPL-MCNC: 1.1 MG/DL (ref 0.5–1)
EOSINOPHILS ABSOLUTE: 0.74 E9/L (ref 0.05–0.5)
EOSINOPHILS RELATIVE PERCENT: 5.7 % (ref 0–6)
GFR AFRICAN AMERICAN: 56
GFR NON-AFRICAN AMERICAN: 46 ML/MIN/1.73
GLUCOSE BLD-MCNC: 92 MG/DL (ref 74–99)
HCT VFR BLD CALC: 28 % (ref 34–48)
HEMOGLOBIN: 8.9 G/DL (ref 11.5–15.5)
IMMATURE GRANULOCYTES #: 0.08 E9/L
IMMATURE GRANULOCYTES %: 0.6 % (ref 0–5)
LYMPHOCYTES ABSOLUTE: 1.36 E9/L (ref 1.5–4)
LYMPHOCYTES RELATIVE PERCENT: 10.4 % (ref 20–42)
MAGNESIUM: 1.8 MG/DL (ref 1.6–2.6)
MCH RBC QN AUTO: 32.1 PG (ref 26–35)
MCHC RBC AUTO-ENTMCNC: 31.8 % (ref 32–34.5)
MCV RBC AUTO: 101.1 FL (ref 80–99.9)
MONOCYTES ABSOLUTE: 0.63 E9/L (ref 0.1–0.95)
MONOCYTES RELATIVE PERCENT: 4.8 % (ref 2–12)
NEUTROPHILS ABSOLUTE: 10.18 E9/L (ref 1.8–7.3)
NEUTROPHILS RELATIVE PERCENT: 78 % (ref 43–80)
PDW BLD-RTO: 14.6 FL (ref 11.5–15)
PHOSPHORUS: 2.5 MG/DL (ref 2.5–4.5)
PLATELET # BLD: 233 E9/L (ref 130–450)
PMV BLD AUTO: 9.5 FL (ref 7–12)
POTASSIUM SERPL-SCNC: 3.8 MMOL/L (ref 3.5–5)
PRO-BNP: 1013 PG/ML (ref 0–450)
RBC # BLD: 2.77 E12/L (ref 3.5–5.5)
SODIUM BLD-SCNC: 134 MMOL/L (ref 132–146)
WBC # BLD: 13.1 E9/L (ref 4.5–11.5)

## 2019-01-03 PROCEDURE — 6370000000 HC RX 637 (ALT 250 FOR IP): Performed by: INTERNAL MEDICINE

## 2019-01-03 PROCEDURE — 97530 THERAPEUTIC ACTIVITIES: CPT

## 2019-01-03 PROCEDURE — 80048 BASIC METABOLIC PNL TOTAL CA: CPT

## 2019-01-03 PROCEDURE — 2060000000 HC ICU INTERMEDIATE R&B

## 2019-01-03 PROCEDURE — 2580000003 HC RX 258: Performed by: INTERNAL MEDICINE

## 2019-01-03 PROCEDURE — 83735 ASSAY OF MAGNESIUM: CPT

## 2019-01-03 PROCEDURE — 84100 ASSAY OF PHOSPHORUS: CPT

## 2019-01-03 PROCEDURE — 2700000000 HC OXYGEN THERAPY PER DAY

## 2019-01-03 PROCEDURE — 2580000003 HC RX 258: Performed by: EMERGENCY MEDICINE

## 2019-01-03 PROCEDURE — 36415 COLL VENOUS BLD VENIPUNCTURE: CPT

## 2019-01-03 PROCEDURE — 83880 ASSAY OF NATRIURETIC PEPTIDE: CPT

## 2019-01-03 PROCEDURE — 6370000000 HC RX 637 (ALT 250 FOR IP): Performed by: EMERGENCY MEDICINE

## 2019-01-03 PROCEDURE — 85025 COMPLETE CBC W/AUTO DIFF WBC: CPT

## 2019-01-03 RX ORDER — AMIODARONE HYDROCHLORIDE 200 MG/1
100 TABLET ORAL DAILY
DISCHARGE
Start: 2019-01-03

## 2019-01-03 RX ORDER — LOPERAMIDE HYDROCHLORIDE 2 MG/1
2 CAPSULE ORAL 4 TIMES DAILY PRN
DISCHARGE
Start: 2019-01-03 | End: 2019-01-13

## 2019-01-03 RX ORDER — CEPHALEXIN 250 MG/1
250 CAPSULE ORAL EVERY 12 HOURS SCHEDULED
Qty: 14 CAPSULE | Refills: 0 | Status: SHIPPED | OUTPATIENT
Start: 2019-01-03 | End: 2019-01-10

## 2019-01-03 RX ORDER — CEPHALEXIN 250 MG/1
250 CAPSULE ORAL EVERY 12 HOURS SCHEDULED
Status: DISCONTINUED | OUTPATIENT
Start: 2019-01-03 | End: 2019-01-04 | Stop reason: HOSPADM

## 2019-01-03 RX ORDER — METOCLOPRAMIDE 5 MG/1
2.5 TABLET ORAL 3 TIMES DAILY PRN
Qty: 120 TABLET | Refills: 3 | DISCHARGE
Start: 2019-01-03

## 2019-01-03 RX ORDER — LEVOTHYROXINE SODIUM 0.05 MG/1
50 TABLET ORAL DAILY
Qty: 30 TABLET | Refills: 3 | DISCHARGE
Start: 2019-01-04

## 2019-01-03 RX ORDER — METOCLOPRAMIDE 5 MG/1
2.5 TABLET ORAL 3 TIMES DAILY PRN
Status: DISCONTINUED | OUTPATIENT
Start: 2019-01-03 | End: 2019-01-04 | Stop reason: HOSPADM

## 2019-01-03 RX ADMIN — DIPHENHYDRAMINE HCL 25 MG: 25 TABLET ORAL at 21:38

## 2019-01-03 RX ADMIN — AMIODARONE HYDROCHLORIDE 100 MG: 200 TABLET ORAL at 09:23

## 2019-01-03 RX ADMIN — CLOPIDOGREL BISULFATE 75 MG: 75 TABLET ORAL at 09:22

## 2019-01-03 RX ADMIN — SODIUM CHLORIDE: 9 INJECTION, SOLUTION INTRAVENOUS at 22:54

## 2019-01-03 RX ADMIN — CETIRIZINE HYDROCHLORIDE 5 MG: 10 TABLET, FILM COATED ORAL at 09:23

## 2019-01-03 RX ADMIN — Medication 1 TABLET: at 09:22

## 2019-01-03 RX ADMIN — FAMOTIDINE 10 MG: 20 TABLET ORAL at 09:27

## 2019-01-03 RX ADMIN — CEPHALEXIN 250 MG: 250 CAPSULE ORAL at 21:13

## 2019-01-03 RX ADMIN — METOCLOPRAMIDE 2.5 MG: 5 TABLET ORAL at 06:50

## 2019-01-03 RX ADMIN — SERTRALINE HYDROCHLORIDE 50 MG: 50 TABLET ORAL at 09:22

## 2019-01-03 RX ADMIN — METOCLOPRAMIDE 2.5 MG: 5 TABLET ORAL at 11:13

## 2019-01-03 RX ADMIN — Medication 10 ML: at 09:22

## 2019-01-03 RX ADMIN — MICONAZOLE NITRATE: 2 OINTMENT TOPICAL at 21:13

## 2019-01-03 RX ADMIN — ACETAMINOPHEN 650 MG: 325 TABLET ORAL at 13:31

## 2019-01-03 RX ADMIN — Medication 10 ML: at 21:13

## 2019-01-03 RX ADMIN — SODIUM CHLORIDE: 9 INJECTION, SOLUTION INTRAVENOUS at 04:28

## 2019-01-03 RX ADMIN — MICONAZOLE NITRATE: 2 OINTMENT TOPICAL at 09:22

## 2019-01-03 RX ADMIN — LEVOTHYROXINE SODIUM 50 MCG: 50 TABLET ORAL at 06:50

## 2019-01-03 ASSESSMENT — PAIN DESCRIPTION - PAIN TYPE: TYPE: ACUTE PAIN

## 2019-01-03 ASSESSMENT — PAIN SCALES - GENERAL
PAINLEVEL_OUTOF10: 0
PAINLEVEL_OUTOF10: 3

## 2019-01-03 ASSESSMENT — PAIN DESCRIPTION - LOCATION: LOCATION: COCCYX;BUTTOCKS

## 2019-01-03 ASSESSMENT — PAIN DESCRIPTION - DESCRIPTORS: DESCRIPTORS: ACHING;DISCOMFORT

## 2019-01-03 ASSESSMENT — PAIN DESCRIPTION - PROGRESSION: CLINICAL_PROGRESSION: NOT CHANGED

## 2019-01-03 ASSESSMENT — PAIN DESCRIPTION - FREQUENCY: FREQUENCY: INTERMITTENT

## 2019-01-03 ASSESSMENT — PAIN DESCRIPTION - ONSET: ONSET: ON-GOING

## 2019-01-04 VITALS
HEART RATE: 75 BPM | RESPIRATION RATE: 16 BRPM | HEIGHT: 66 IN | BODY MASS INDEX: 19.37 KG/M2 | TEMPERATURE: 97.7 F | DIASTOLIC BLOOD PRESSURE: 60 MMHG | SYSTOLIC BLOOD PRESSURE: 130 MMHG | OXYGEN SATURATION: 97 % | WEIGHT: 120.5 LBS

## 2019-01-04 LAB
BASOPHILS ABSOLUTE: 0.04 E9/L (ref 0–0.2)
BASOPHILS RELATIVE PERCENT: 0.5 % (ref 0–2)
EOSINOPHILS ABSOLUTE: 1.11 E9/L (ref 0.05–0.5)
EOSINOPHILS RELATIVE PERCENT: 12.8 % (ref 0–6)
HCT VFR BLD CALC: 30.6 % (ref 34–48)
HEMOGLOBIN: 9.8 G/DL (ref 11.5–15.5)
IMMATURE GRANULOCYTES #: 0.07 E9/L
IMMATURE GRANULOCYTES %: 0.8 % (ref 0–5)
LYMPHOCYTES ABSOLUTE: 1.26 E9/L (ref 1.5–4)
LYMPHOCYTES RELATIVE PERCENT: 14.5 % (ref 20–42)
MCH RBC QN AUTO: 32.6 PG (ref 26–35)
MCHC RBC AUTO-ENTMCNC: 32 % (ref 32–34.5)
MCV RBC AUTO: 101.7 FL (ref 80–99.9)
MONOCYTES ABSOLUTE: 0.55 E9/L (ref 0.1–0.95)
MONOCYTES RELATIVE PERCENT: 6.3 % (ref 2–12)
NEUTROPHILS ABSOLUTE: 5.66 E9/L (ref 1.8–7.3)
NEUTROPHILS RELATIVE PERCENT: 65.1 % (ref 43–80)
ORGANISM: ABNORMAL
ORGANISM: ABNORMAL
PDW BLD-RTO: 14.8 FL (ref 11.5–15)
PLATELET # BLD: 269 E9/L (ref 130–450)
PMV BLD AUTO: 9.6 FL (ref 7–12)
RBC # BLD: 3.01 E12/L (ref 3.5–5.5)
URINE CULTURE, ROUTINE: ABNORMAL
WBC # BLD: 8.7 E9/L (ref 4.5–11.5)

## 2019-01-04 PROCEDURE — 36415 COLL VENOUS BLD VENIPUNCTURE: CPT

## 2019-01-04 PROCEDURE — 6370000000 HC RX 637 (ALT 250 FOR IP): Performed by: INTERNAL MEDICINE

## 2019-01-04 PROCEDURE — 6370000000 HC RX 637 (ALT 250 FOR IP): Performed by: EMERGENCY MEDICINE

## 2019-01-04 PROCEDURE — 85025 COMPLETE CBC W/AUTO DIFF WBC: CPT

## 2019-01-04 RX ADMIN — MICONAZOLE NITRATE: 2 OINTMENT TOPICAL at 08:51

## 2019-01-04 RX ADMIN — BUMETANIDE 0.5 MG: 0.5 TABLET ORAL at 06:17

## 2019-01-04 RX ADMIN — ACETAMINOPHEN 650 MG: 325 TABLET ORAL at 02:44

## 2019-01-04 RX ADMIN — AMIODARONE HYDROCHLORIDE 100 MG: 200 TABLET ORAL at 08:51

## 2019-01-04 RX ADMIN — CLOPIDOGREL BISULFATE 75 MG: 75 TABLET ORAL at 08:51

## 2019-01-04 RX ADMIN — CETIRIZINE HYDROCHLORIDE 5 MG: 10 TABLET, FILM COATED ORAL at 08:51

## 2019-01-04 RX ADMIN — SERTRALINE HYDROCHLORIDE 50 MG: 50 TABLET ORAL at 08:51

## 2019-01-04 RX ADMIN — FAMOTIDINE 10 MG: 20 TABLET ORAL at 08:51

## 2019-01-04 RX ADMIN — CEPHALEXIN 250 MG: 250 CAPSULE ORAL at 08:51

## 2019-01-04 RX ADMIN — LEVOTHYROXINE SODIUM 50 MCG: 50 TABLET ORAL at 06:17

## 2019-01-04 RX ADMIN — Medication 1 TABLET: at 08:51

## 2019-01-04 ASSESSMENT — PAIN SCALES - GENERAL
PAINLEVEL_OUTOF10: 0
PAINLEVEL_OUTOF10: 7
PAINLEVEL_OUTOF10: 0

## 2019-01-04 ASSESSMENT — PAIN DESCRIPTION - FREQUENCY: FREQUENCY: INTERMITTENT

## 2019-01-04 ASSESSMENT — PAIN DESCRIPTION - ONSET: ONSET: GRADUAL

## 2019-01-04 ASSESSMENT — PAIN DESCRIPTION - ORIENTATION: ORIENTATION: RIGHT;LEFT

## 2019-01-04 ASSESSMENT — PAIN DESCRIPTION - DESCRIPTORS: DESCRIPTORS: ACHING;DISCOMFORT;PRESSURE

## 2019-01-04 ASSESSMENT — PAIN DESCRIPTION - LOCATION: LOCATION: HEAD

## 2019-01-04 ASSESSMENT — PAIN DESCRIPTION - PAIN TYPE: TYPE: ACUTE PAIN

## 2019-01-05 LAB — CULTURE, STOOL: NORMAL

## 2019-01-06 LAB — BLOOD CULTURE, ROUTINE: NORMAL

## 2019-01-11 ENCOUNTER — HOSPITAL ENCOUNTER (OUTPATIENT)
Dept: WOUND CARE | Age: 84
Discharge: HOME | End: 2019-01-11
Payer: COMMERCIAL

## 2019-01-11 VITALS
TEMPERATURE: 97.6 F | HEART RATE: 60 BPM | SYSTOLIC BLOOD PRESSURE: 116 MMHG | RESPIRATION RATE: 18 BRPM | DIASTOLIC BLOOD PRESSURE: 62 MMHG

## 2019-01-11 DIAGNOSIS — L89.152 DECUBITUS ULCER OF SACRAL REGION, STAGE 2 (HCC): ICD-10-CM

## 2019-01-11 PROCEDURE — 11042 DBRDMT SUBQ TIS 1ST 20SQCM/<: CPT

## 2019-01-11 PROCEDURE — 97597 DBRDMT OPN WND 1ST 20 CM/<: CPT

## 2019-01-11 PROCEDURE — 97597 DBRDMT OPN WND 1ST 20 CM/<: CPT | Performed by: NURSE PRACTITIONER

## 2019-02-22 ENCOUNTER — HOSPITAL ENCOUNTER (OUTPATIENT)
Dept: WOUND CARE | Age: 84
Discharge: HOME | End: 2019-02-22
Payer: MEDICARE

## 2019-02-22 VITALS
BODY MASS INDEX: 19.05 KG/M2 | HEART RATE: 60 BPM | SYSTOLIC BLOOD PRESSURE: 116 MMHG | RESPIRATION RATE: 18 BRPM | TEMPERATURE: 97.8 F | WEIGHT: 118 LBS | DIASTOLIC BLOOD PRESSURE: 60 MMHG

## 2019-02-22 PROCEDURE — 11042 DBRDMT SUBQ TIS 1ST 20SQCM/<: CPT

## 2019-02-22 PROCEDURE — 11042 DBRDMT SUBQ TIS 1ST 20SQCM/<: CPT | Performed by: NURSE PRACTITIONER

## 2019-03-08 RX ORDER — LOPERAMIDE HYDROCHLORIDE 2 MG/1
2 CAPSULE ORAL 4 TIMES DAILY PRN
COMMUNITY

## 2019-03-08 RX ORDER — OXYBUTYNIN CHLORIDE 5 MG/1
5 TABLET ORAL 3 TIMES DAILY
COMMUNITY

## 2019-03-12 ENCOUNTER — HOSPITAL ENCOUNTER (OUTPATIENT)
Age: 84
Setting detail: OUTPATIENT SURGERY
Discharge: ANOTHER HOSPITAL | End: 2019-03-12
Attending: UROLOGY | Admitting: UROLOGY
Payer: MEDICARE

## 2019-03-12 ENCOUNTER — ANESTHESIA (OUTPATIENT)
Dept: OPERATING ROOM | Age: 84
End: 2019-03-12
Payer: MEDICARE

## 2019-03-12 ENCOUNTER — ANESTHESIA EVENT (OUTPATIENT)
Dept: OPERATING ROOM | Age: 84
End: 2019-03-12
Payer: MEDICARE

## 2019-03-12 ENCOUNTER — HOSPITAL ENCOUNTER (OUTPATIENT)
Dept: GENERAL RADIOLOGY | Age: 84
Setting detail: OUTPATIENT SURGERY
Discharge: HOME | End: 2019-03-14
Attending: UROLOGY
Payer: MEDICARE

## 2019-03-12 VITALS
DIASTOLIC BLOOD PRESSURE: 78 MMHG | BODY MASS INDEX: 19.37 KG/M2 | HEART RATE: 74 BPM | SYSTOLIC BLOOD PRESSURE: 162 MMHG | OXYGEN SATURATION: 100 % | WEIGHT: 120 LBS | TEMPERATURE: 98.2 F | RESPIRATION RATE: 22 BRPM

## 2019-03-12 VITALS — SYSTOLIC BLOOD PRESSURE: 95 MMHG | OXYGEN SATURATION: 100 % | DIASTOLIC BLOOD PRESSURE: 49 MMHG

## 2019-03-12 DIAGNOSIS — R52 PAIN: ICD-10-CM

## 2019-03-12 PROCEDURE — 3600000012 HC SURGERY LEVEL 2 ADDTL 15MIN: Performed by: UROLOGY

## 2019-03-12 PROCEDURE — 2580000003 HC RX 258: Performed by: UROLOGY

## 2019-03-12 PROCEDURE — 2709999900 HC NON-CHARGEABLE SUPPLY: Performed by: UROLOGY

## 2019-03-12 PROCEDURE — 6360000002 HC RX W HCPCS: Performed by: NURSE ANESTHETIST, CERTIFIED REGISTERED

## 2019-03-12 PROCEDURE — 3700000000 HC ANESTHESIA ATTENDED CARE: Performed by: UROLOGY

## 2019-03-12 PROCEDURE — 7100000000 HC PACU RECOVERY - FIRST 15 MIN: Performed by: UROLOGY

## 2019-03-12 PROCEDURE — 6360000002 HC RX W HCPCS: Performed by: UROLOGY

## 2019-03-12 PROCEDURE — 74420 UROGRAPHY RTRGR +-KUB: CPT

## 2019-03-12 PROCEDURE — 7100000001 HC PACU RECOVERY - ADDTL 15 MIN: Performed by: UROLOGY

## 2019-03-12 PROCEDURE — 3600000002 HC SURGERY LEVEL 2 BASE: Performed by: UROLOGY

## 2019-03-12 PROCEDURE — C1758 CATHETER, URETERAL: HCPCS | Performed by: UROLOGY

## 2019-03-12 PROCEDURE — 7100000010 HC PHASE II RECOVERY - FIRST 15 MIN: Performed by: UROLOGY

## 2019-03-12 PROCEDURE — 6360000004 HC RX CONTRAST MEDICATION: Performed by: UROLOGY

## 2019-03-12 PROCEDURE — 2500000003 HC RX 250 WO HCPCS: Performed by: UROLOGY

## 2019-03-12 PROCEDURE — C1769 GUIDE WIRE: HCPCS | Performed by: UROLOGY

## 2019-03-12 PROCEDURE — 3700000001 HC ADD 15 MINUTES (ANESTHESIA): Performed by: UROLOGY

## 2019-03-12 PROCEDURE — 7100000011 HC PHASE II RECOVERY - ADDTL 15 MIN: Performed by: UROLOGY

## 2019-03-12 RX ORDER — CIPROFLOXACIN 2 MG/ML
400 INJECTION, SOLUTION INTRAVENOUS
Status: COMPLETED | OUTPATIENT
Start: 2019-03-12 | End: 2019-03-12

## 2019-03-12 RX ORDER — HYDROCODONE BITARTRATE AND ACETAMINOPHEN 5; 325 MG/1; MG/1
2 TABLET ORAL PRN
Status: DISCONTINUED | OUTPATIENT
Start: 2019-03-12 | End: 2019-03-12 | Stop reason: HOSPADM

## 2019-03-12 RX ORDER — FENTANYL CITRATE 50 UG/ML
INJECTION, SOLUTION INTRAMUSCULAR; INTRAVENOUS PRN
Status: DISCONTINUED | OUTPATIENT
Start: 2019-03-12 | End: 2019-03-12 | Stop reason: SDUPTHER

## 2019-03-12 RX ORDER — SODIUM CHLORIDE 0.9 % (FLUSH) 0.9 %
10 SYRINGE (ML) INJECTION EVERY 12 HOURS SCHEDULED
Status: DISCONTINUED | OUTPATIENT
Start: 2019-03-12 | End: 2019-03-12 | Stop reason: HOSPADM

## 2019-03-12 RX ORDER — HYDROCODONE BITARTRATE AND ACETAMINOPHEN 5; 325 MG/1; MG/1
1 TABLET ORAL PRN
Status: DISCONTINUED | OUTPATIENT
Start: 2019-03-12 | End: 2019-03-12 | Stop reason: HOSPADM

## 2019-03-12 RX ORDER — LIDOCAINE HYDROCHLORIDE 10 MG/ML
INJECTION, SOLUTION EPIDURAL; INFILTRATION; INTRACAUDAL; PERINEURAL PRN
Status: DISCONTINUED | OUTPATIENT
Start: 2019-03-12 | End: 2019-03-12 | Stop reason: ALTCHOICE

## 2019-03-12 RX ORDER — SODIUM CHLORIDE, SODIUM LACTATE, POTASSIUM CHLORIDE, CALCIUM CHLORIDE 600; 310; 30; 20 MG/100ML; MG/100ML; MG/100ML; MG/100ML
INJECTION, SOLUTION INTRAVENOUS CONTINUOUS
Status: DISCONTINUED | OUTPATIENT
Start: 2019-03-12 | End: 2019-03-12 | Stop reason: HOSPADM

## 2019-03-12 RX ORDER — PROPOFOL 10 MG/ML
INJECTION, EMULSION INTRAVENOUS CONTINUOUS PRN
Status: DISCONTINUED | OUTPATIENT
Start: 2019-03-12 | End: 2019-03-12 | Stop reason: SDUPTHER

## 2019-03-12 RX ORDER — PROPOFOL 10 MG/ML
INJECTION, EMULSION INTRAVENOUS PRN
Status: DISCONTINUED | OUTPATIENT
Start: 2019-03-12 | End: 2019-03-12 | Stop reason: SDUPTHER

## 2019-03-12 RX ORDER — SODIUM CHLORIDE 9 MG/ML
INJECTION, SOLUTION INTRAVENOUS CONTINUOUS
Status: DISCONTINUED | OUTPATIENT
Start: 2019-03-12 | End: 2019-03-12 | Stop reason: HOSPADM

## 2019-03-12 RX ORDER — SODIUM CHLORIDE 0.9 % (FLUSH) 0.9 %
10 SYRINGE (ML) INJECTION PRN
Status: DISCONTINUED | OUTPATIENT
Start: 2019-03-12 | End: 2019-03-12 | Stop reason: HOSPADM

## 2019-03-12 RX ADMIN — PROPOFOL 70 MCG/KG/MIN: 10 INJECTION, EMULSION INTRAVENOUS at 09:45

## 2019-03-12 RX ADMIN — CIPROFLOXACIN 400 MG: 2 INJECTION, SOLUTION INTRAVENOUS at 08:47

## 2019-03-12 RX ADMIN — SODIUM CHLORIDE: 9 INJECTION, SOLUTION INTRAVENOUS at 09:45

## 2019-03-12 RX ADMIN — FENTANYL CITRATE 25 MCG: 50 INJECTION, SOLUTION INTRAMUSCULAR; INTRAVENOUS at 09:45

## 2019-03-12 RX ADMIN — FENTANYL CITRATE 25 MCG: 50 INJECTION, SOLUTION INTRAMUSCULAR; INTRAVENOUS at 09:40

## 2019-03-12 RX ADMIN — FENTANYL CITRATE 25 MCG: 50 INJECTION, SOLUTION INTRAMUSCULAR; INTRAVENOUS at 09:59

## 2019-03-12 RX ADMIN — PROPOFOL 40 MG: 10 INJECTION, EMULSION INTRAVENOUS at 09:45

## 2019-03-12 RX ADMIN — FENTANYL CITRATE 25 MCG: 50 INJECTION, SOLUTION INTRAMUSCULAR; INTRAVENOUS at 10:02

## 2019-03-12 ASSESSMENT — PULMONARY FUNCTION TESTS
PIF_VALUE: 1
PIF_VALUE: 0
PIF_VALUE: 1

## 2019-03-12 ASSESSMENT — PAIN SCALES - GENERAL
PAINLEVEL_OUTOF10: 0

## 2019-03-12 ASSESSMENT — PAIN - FUNCTIONAL ASSESSMENT: PAIN_FUNCTIONAL_ASSESSMENT: 0-10

## 2019-03-29 ENCOUNTER — HOSPITAL ENCOUNTER (OUTPATIENT)
Dept: WOUND CARE | Age: 84
Discharge: HOME | End: 2019-03-29
Payer: MEDICARE

## 2019-03-29 VITALS
RESPIRATION RATE: 20 BRPM | HEART RATE: 72 BPM | DIASTOLIC BLOOD PRESSURE: 72 MMHG | TEMPERATURE: 97.8 F | BODY MASS INDEX: 19.37 KG/M2 | WEIGHT: 120 LBS | SYSTOLIC BLOOD PRESSURE: 140 MMHG

## 2019-03-29 DIAGNOSIS — L89.153 DECUBITUS ULCER OF SACRAL REGION, STAGE 3 (HCC): ICD-10-CM

## 2019-03-29 PROCEDURE — 11042 DBRDMT SUBQ TIS 1ST 20SQCM/<: CPT | Performed by: NURSE PRACTITIONER

## 2019-03-29 PROCEDURE — 11042 DBRDMT SUBQ TIS 1ST 20SQCM/<: CPT

## 2019-04-12 NOTE — PROGRESS NOTES
Throat 168 lozenge     famotidine (PEPCID) 10 MG tablet Take 1 tablet by mouth daily 60 tablet 3    loperamide (IMODIUM) 2 MG capsule Take 2 mg by mouth 4 times daily as needed for Diarrhea      diphenhydrAMINE (BENADRYL) 25 MG tablet Take 25 mg by mouth every 6 hours as needed for Itching      Sodium Phosphates (FLEET) 7-19 GM/118ML Place 1 enema rectally as needed      magnesium hydroxide (MILK OF MAGNESIA) 400 MG/5ML suspension Take 30 mLs by mouth daily as needed for Constipation      nitroGLYCERIN (NITROSTAT) 0.4 MG SL tablet up to max of 3 total doses. If no relief after 1 dose, call 911. 25 tablet 3     No current facility-administered medications on file prior to encounter. REVIEW OF SYSTEMS See HPI    Objective:    /84   Pulse 72   Temp 97.5 °F (36.4 °C) (Oral)   Resp 16   Ht 5' (1.524 m)   Wt 120 lb (54.4 kg)   BMI 23.44 kg/m²   Wt Readings from Last 3 Encounters:   04/12/19 120 lb (54.4 kg)   03/29/19 120 lb (54.4 kg)   03/12/19 120 lb (54.4 kg)     PHYSICAL EXAM  CONSTITUTIONAL:   Awake, alert, cooperative   EYES:  lids and lashes normal   ENT: external ears and nose without lesions   NECK:  supple, symmetrical, trachea midline   SKIN:  Open wound Present    Assessment:     Problem List Items Addressed This Visit     None        Procedure Note  Indications:  Based on my examination of this patient's wound(s)/ulcer(s) today, debridement is required to promote healing and evaluate the wound base. Performed by: CALLIE Gifford CNP    Consent obtained:  Yes    Time out taken:  Yes    Pain Control: Anesthetic  Anesthetic: 2% Lidocaine Gel Topical     Debridement:Excisional Debridement    Using curette the wound(s)/ulcer(s) was/were sharply debrided down through and including the removal of subcutaneous tissue.         Devitalized Tissue Debrided:  biofilm and slough to stimulate bleeding to promote healing, post debridement good bleeding base and wound edges noted    Pre Debridement Measurements:  Are located in the [facility]  Documentation Flow Sheet    Wound/Ulcer #: 3    Post Debridement Measurements:  Wound/Ulcer Descriptions are Pre Debridement except measurements:    Wound 03/09/18 Coccyx #1 stage 3 acq 2/2/18 (Active)   Wound Image   3/29/2019  8:05 AM   Wound Pressure Stage  3 11/30/2018  8:06 AM   Dressing Status Clean;Dry; Intact 3/29/2019  8:53 AM   Dressing Changed Changed/New 3/29/2019  8:53 AM   Dressing/Treatment Alginate;Dry Dressing 3/29/2019  8:53 AM   Wound Cleansed Rinsed/Irrigated with saline 3/29/2019  8:53 AM   Wound Length (cm) 2.2 cm 4/12/2019  8:21 AM   Wound Width (cm) 1.2 cm 4/12/2019  8:21 AM   Wound Depth (cm) 0.9 cm 4/12/2019  8:21 AM   Wound Surface Area (cm^2) 2.64 cm^2 4/12/2019  8:21 AM   Change in Wound Size % (l*w) -252 4/12/2019  8:21 AM   Wound Volume (cm^3) 2.38 cm^3 4/12/2019  8:21 AM   Wound Healing % -693 4/12/2019  8:21 AM   Post-Procedure Length (cm) 2.2 cm 4/12/2019  8:44 AM   Post-Procedure Width (cm) 1.2 cm 4/12/2019  8:44 AM   Post-Procedure Depth (cm) 1 cm 4/12/2019  8:44 AM   Post-Procedure Surface Area (cm^2) 2.64 cm^2 4/12/2019  8:44 AM   Post-Procedure Volume (cm^3) 2.64 cm^3 4/12/2019  8:44 AM   Wound Assessment Slough; Yellow;Pink;Red; White 4/12/2019  8:21 AM   Drainage Amount Scant 4/12/2019  8:21 AM   Drainage Description Serosanguinous; Yellow 4/12/2019  8:21 AM   Odor None 4/12/2019  8:21 AM   Divya-wound Assessment Fragile; Excoriated 4/12/2019  8:21 AM   Number of days: 398       Wound 01/01/19 Coccyx Right;Upper ulceration (Active)   Number of days: 101       Wound 01/01/19 (Active)   Number of days: 101       Wound 01/01/19 Ischium Right; Lower; Posterior ulceration (Active)   Number of days: 101       Wound 01/01/19 Ischium Posterior ulceration (Active)   Number of days: 101     Percent of Wound/Ulcer Debrided: 100%    Total Surface Area Debrided:  2.6 sq cm     Estimated Blood Loss:  Minimal  Hemostasis Achieved:  by pressure    Procedural Pain:  5  / 10   Post Procedural Pain:  4 / 10     Response to treatment:  Well tolerated by patient. Plan:   Treatment Note please see attached Discharge Instructions    Written patient dismissal instructions given to patient and signed by patient or POA. Discharge Instructions       Visit Discharge/Physician Orders     Discharge condition: Stable     Assessment of pain at discharge: NONE      Anesthetic used:  2% LIDOCAINE GEL     Discharge to: ECF      Left via:ambulance     Accompanied by: family      ECF/[facility]HA: [facility] phone: [phone] Horace [place]: [phone]     Dressing Orders:  COCCYX:   CLEANSE WOUND WITH NORMAL STERILE SALINE. Begin to apply santyl to the thickness of a nickel to the yellow slough area of the wound and  Pack lightly with AQUACEL and BEGIN TO APPLY TRIAMCINOLONE 0.1% CREAM to b/l buttocks and cover with ABD pad with little to no tape covering the entire coccyx area, change ABD pad twice daily and as needed.  Please do not put patient in a brief, leave her open to air when in bed unless she is having diarrhea. If patient is in bed without bottoms on please make sure the wound is covered, you may apply small amount of tape to keep it in place, wound should be covered at all times!     MAINTAIN KOEHLER CATH UNTIL STAGE 3 PRESSURE ULCER IS HEALED      Treatment Orders:  FOLLOW NUTRITIOUS DIET. CHOOSE FOODS HIGH IN PROTEIN -CHICKEN- FISH-AND EGGS,  CHOOSE FOODS HIGH IN VITAMIN C.   MULTIVITAMIN DAILY.       LOW AIR LOSS MATTRESS  BED.       REPOSITION EVERY 1-2 HOURS. KEEP PRESSURE OFF WOUNDS. - SIDE TO SIDE AS MUCH AS POSSIBLE      NOT TO SIT FOR PROLONGED PERIOD OF TIMES.      [facility] followup visit _____3 WEEKS ______________________  (Please note your next appointment above and if you are unable to keep, kindly give a 24 hour notice.  Thank you.)     Physician signature:__________________________        If you experience any of the following, please call the [facility] during business hours:     * Increase in Pain  * Temperature over 101  * Increase in drainage from your wound  * Drainage with a foul odor  * Bleeding  * Increase in swelling  * Need for compression bandage changes due to slippage, breakthrough drainage.     If you need medical attention outside of the business hours of the [facility] please contact your PCP or go to the nearest emergency room.           Electronically signed by Darra Gosselin, APRN - CNP on 4/12/2019 at 9:27 AM

## 2019-05-03 NOTE — PROGRESS NOTES
[facility] Followup Visit Note    Referring Physician : Jessa Blanco MD  [address] RECORD NUMBER:  [id number]  AGE: 80 y.o. GENDER: female  : 1922  EPISODE DATE:  5/3/2019    Subjective:     Chief Complaint   Patient presents with    Wound Check     COCCYX      HISTORY of PRESENT ILLNESS HPI   Abida Altamirano is a 80 y.o. female who presents today in regards to follow up evaluation and treatment of wound/ulcer. That patient's past medical, family and social hx were reviewed and changes were made if present. History of Wound Context: The patient has had a wound of of the sacral region which was first noted approximately mid 2017. It was discovered following a hospitalization for a fractured femur. She now resides in a nursing home She has been making steady progress with consistent offloading. She opened a new area on her left buttocks due to excoriation and moisture . Started using triamcinolone cream. She healed the left buttock wound .  She developed bilateral buttocks wounds 3/29/19, and healed them 5/3/19.      Wound/Ulcer Pain Timing/Severity: constant  Quality of pain: burning  Severity:  3 / 10   Modifying Factors: Pain worsens with pressure  Associated Signs/Symptoms: pain     Ulcer Identification:  Ulcer Type: pressure  Contributing Factors: chronic pressure, decreased mobility and shear force     Diabetic/Pressure/Non Pressure Ulcers only:  Ulcer: Pressure ulcer, Stage 3     Wound: N/A            PAST MEDICAL HISTORY      Diagnosis Date    Acute blood loss anemia 2/10/2018    MYRIAM (acute kidney injury) ([address]     Atrial fibrillation with RVR ([address] 2018    COPD (chronic obstructive pulmonary disease) ([address] 2018    CXR    Mild aortic sclerosis ([address] 2018    PAF (paroxysmal atrial fibrillation) ([address] 2019    Rectal bleed 2019    Scoliosis (and kyphoscoliosis), idiopathic     Thyroid disease      Past Surgical History:   Procedure Laterality Date    CYSTOSCOPY N/A 3/12/2019    CYSTOSCOPY RETROGRADE  SUPRAPUBIC TUBE PLACEMENT performed by Ace Bashir MD at [address] [facility]  2012    cataracts    FRACTURE SURGERY      FRACTURE SURGERY Right 02/07/2018    ORIF right hip    TONSILLECTOMY AND ADENOIDECTOMY       History reviewed. No pertinent family history. Social History     Tobacco Use    Smoking status: Never Smoker    Smokeless tobacco: Never Used   Substance Use Topics    Alcohol use: No    Drug use: No     Allergies   Allergen Reactions    Shellfish-Derived Products Anaphylaxis    Pcn [Penicillins]     Adhesive Tape Rash     Current Outpatient Medications on File Prior to Encounter   Medication Sig Dispense Refill    oxybutynin (DITROPAN) 5 MG tablet Take 5 mg by mouth 3 times daily      loperamide (IMODIUM) 2 MG capsule Take 2 mg by mouth 4 times daily as needed for Diarrhea      levothyroxine (SYNTHROID) 50 MCG tablet Take 1 tablet by mouth Daily 30 tablet 3    metoclopramide (REGLAN) 5 MG tablet Take 0.5 tablets by mouth 3 times daily as needed (Nausea) 120 tablet 3    miconazole nitrate 2 % OINT Apply topically 2 times daily GROIN AND BUTTOCKS  0    amiodarone (CORDARONE) 200 MG tablet Take 0.5 tablets by mouth daily      mineral oil-hydrophilic petrolatum (AQUAPHOR) ointment Apply topically as needed for Dry Skin Apply topically as needed.       bumetanide (BUMEX) 0.5 MG tablet Take 0.5 mg by mouth every other day      diphenhydrAMINE (BENADRYL) 25 MG tablet Take 25 mg by mouth every 6 hours as needed for Itching      enalapril (VASOTEC) 5 MG tablet Take 5 mg by mouth daily      Sodium Phosphates (FLEET) 7-19 GM/118ML Place 1 enema rectally as needed      loratadine (CLARITIN) 10 MG tablet Take 10 mg by mouth daily      magnesium hydroxide (MILK OF MAGNESIA) 400 MG/5ML suspension Take 30 mLs by mouth daily as needed for Constipation      sertraline (ZOLOFT) 50 MG tablet Take 50 mg by mouth daily  Multiple Vitamins-Minerals (THERAPEUTIC MULTIVITAMIN-MINERALS) tablet Take 1 tablet by mouth daily      acetaminophen (TYLENOL) 325 MG tablet Take 2 tablets by mouth every 4 hours as needed for Fever 120 tablet 3    docusate sodium (COLACE, DULCOLAX) 100 MG CAPS Take 100 mg by mouth daily      benzocaine-menthol (CEPACOL SORE THROAT) 15-3.6 MG lozenge Take 1 lozenge by mouth 4 times daily as needed for Sore Throat 168 lozenge     famotidine (PEPCID) 10 MG tablet Take 1 tablet by mouth daily 60 tablet 3    nitroGLYCERIN (NITROSTAT) 0.4 MG SL tablet up to max of 3 total doses. If no relief after 1 dose, call 911. 25 tablet 3     No current facility-administered medications on file prior to encounter. REVIEW OF SYSTEMS See HPI    Objective:    /60   Pulse 64   Temp 97.7 °F (36.5 °C) (Oral)   Resp 18   Wt 120 lb (54.4 kg)   BMI 23.44 kg/m²   Wt Readings from Last 3 Encounters:   05/03/19 120 lb (54.4 kg)   04/12/19 120 lb (54.4 kg)   03/29/19 120 lb (54.4 kg)     PHYSICAL EXAM  CONSTITUTIONAL:   Awake, alert, cooperative   EYES:  lids and lashes normal   ENT: external ears and nose without lesions   NECK:  supple, symmetrical, trachea midline   SKIN:  Open wound Present    Assessment:     Problem List Items Addressed This Visit     Decubitus ulcer of sacral region, stage 3 ([address]        Procedure Note  Indications:  Based on my examination of this patient's wound(s)/ulcer(s) today, debridement is required to promote healing and evaluate the wound base. Performed by: CALLIE Hayes - CNP    Consent obtained:  Yes    Time out taken:  Yes    Pain Control: Anesthetic  Anesthetic: 2% Lidocaine Gel Topical     Debridement:Excisional Debridement    Using curette the wound(s)/ulcer(s) was/were sharply debrided down through and including the removal of subcutaneous tissue.         Devitalized Tissue Debrided:  biofilm and slough to stimulate bleeding to promote healing, post debridement good bleeding base and wound edges noted    Pre Debridement Measurements:  Are located in the [facility]  Documentation Flow Sheet    Wound/Ulcer #: 1    Post Debridement Measurements:  Wound/Ulcer Descriptions are Pre Debridement except measurements:    Wound 03/09/18 Coccyx #1 stage 3 acq 2/2/18 (Active)   Wound Image   3/29/2019  8:05 AM   Wound Pressure Stage  3 11/30/2018  8:06 AM   Dressing Status Clean;Dry; Intact 3/29/2019  8:53 AM   Dressing Changed Changed/New 5/3/2019  8:51 AM   Dressing/Treatment Alginate;Dry Dressing 5/3/2019  8:51 AM   Wound Cleansed Rinsed/Irrigated with saline 5/3/2019  8:51 AM   Wound Length (cm) 2.2 cm 5/3/2019  8:19 AM   Wound Width (cm) 1.3 cm 5/3/2019  8:19 AM   Wound Depth (cm) 0.5 cm 5/3/2019  8:19 AM   Wound Surface Area (cm^2) 2.86 cm^2 5/3/2019  8:19 AM   Change in Wound Size % (l*w) -281.33 5/3/2019  8:19 AM   Wound Volume (cm^3) 1.43 cm^3 5/3/2019  8:19 AM   Wound Healing % -377 5/3/2019  8:19 AM   Post-Procedure Length (cm) 2.2 cm 5/3/2019  8:50 AM   Post-Procedure Width (cm) 1.3 cm 5/3/2019  8:50 AM   Post-Procedure Depth (cm) 0.6 cm 5/3/2019  8:50 AM   Post-Procedure Surface Area (cm^2) 2.86 cm^2 5/3/2019  8:50 AM   Post-Procedure Volume (cm^3) 1.72 cm^3 5/3/2019  8:50 AM   Undermining Starts ___ O'Clock 9 5/3/2019  8:19 AM   Undermining Ends___ O'Clock 12 5/3/2019  8:19 AM   Undermining Maxium Distance (cm) .9 5/3/2019  8:19 AM   Wound Assessment Yellow;Pink 5/3/2019  8:19 AM   Drainage Amount Small 5/3/2019  8:19 AM   Drainage Description Yellow 5/3/2019  8:19 AM   Odor None 5/3/2019  8:19 AM   Divya-wound Assessment Pink 5/3/2019  8:19 AM   Number of days: 419       Wound 01/01/19 Coccyx Right;Upper ulceration (Active)   Number of days: 121       Wound 01/01/19 (Active)   Number of days: 121       Wound 01/01/19 Ischium Right; Lower; Posterior ulceration (Active)   Number of days: 121       Wound 01/01/19 Ischium Posterior ulceration (Active)   Number of days: 121 Percent of Wound/Ulcer Debrided: 100%    Total Surface Area Debrided:  2.8 sq cm     Estimated Blood Loss:  Minimal  Hemostasis Achieved:  by pressure    Procedural Pain:  3  / 10   Post Procedural Pain:  2 / 10     Response to treatment:  Well tolerated by patient. Plan:   Treatment Note please see attached Discharge Instructions    Written patient dismissal instructions given to patient and signed by patient or POA. Discharge Instructions       Visit Discharge/Physician Orders     Discharge condition: Stable     Assessment of pain at discharge: NONE      Anesthetic used:  2% LIDOCAINE GEL     Discharge to: ECF      Left via:ambulance     Accompanied by: family      ECF/HHA: [address] [place] [address] phone: [phone] SAGAR Chatman Host: [phone]     Dressing Orders:  COCCYX:   CLEANSE WOUND WITH NORMAL STERILE SALINE. Begin to apply santyl to the thickness of a nickel to the yellow slough area of the wound and  Pack lightly with AQUACEL and BEGIN TO APPLY TRIAMCINOLONE 0.1% CREAM to b/l buttocks and cover with ABD pad with little to no tape covering the entire coccyx area, change ABD pad twice daily and as needed.  Please do not put patient in a brief, leave her open to air when in bed unless she is having diarrhea. If patient is in bed without bottoms on please make sure the wound is covered, you may apply small amount of tape to keep it in place, wound should be covered at all times!     MAINTAIN KOEHLER CATH UNTIL STAGE 3 PRESSURE ULCER IS HEALED      Treatment Orders:  FOLLOW NUTRITIOUS DIET. CHOOSE FOODS HIGH IN PROTEIN -CHICKEN- FISH-AND EGGS,  CHOOSE FOODS HIGH IN VITAMIN C.   MULTIVITAMIN DAILY.       LOW AIR LOSS MATTRESS  BED.       REPOSITION EVERY 1-2 HOURS.  KEEP PRESSURE OFF WOUNDS. - SIDE TO SIDE AS MUCH AS POSSIBLE      NOT TO SIT FOR PROLONGED PERIOD OF TIMES.      [facility] followup visit _____3 WEEKS ______________________  (Please note your next appointment above and if you are unable to keep, kindly give a 24 hour notice. Thank you.)     Physician signature:__________________________        If you experience any of the following, please call the [facility] during business hours:     * Increase in Pain  * Temperature over 101  * Increase in drainage from your wound  * Drainage with a foul odor  * Bleeding  * Increase in swelling  * Need for compression bandage changes due to slippage, breakthrough drainage.     If you need medical attention outside of the business hours of the [facility] [address] please contact your PCP or go to the nearest emergency room.           Electronically signed by CALLIE Acosta CNP on 5/3/2019 at 8:53 AM

## 2019-06-06 NOTE — PROGRESS NOTES
Laterality Date    CYSTOSCOPY N/A 3/12/2019    CYSTOSCOPY RETROGRADE  SUPRAPUBIC TUBE PLACEMENT performed by Didi Dutta MD at [address]  2012    cataracts    FRACTURE SURGERY      FRACTURE SURGERY Right 02/07/2018    ORIF right hip    TONSILLECTOMY AND ADENOIDECTOMY       History reviewed. No pertinent family history. Social History     Tobacco Use    Smoking status: Never Smoker    Smokeless tobacco: Never Used   Substance Use Topics    Alcohol use: No    Drug use: No     Allergies   Allergen Reactions    Shellfish-Derived Products Anaphylaxis    Pcn [Penicillins]     Adhesive Tape Rash     Current Outpatient Medications on File Prior to Encounter   Medication Sig Dispense Refill    oxybutynin (DITROPAN) 5 MG tablet Take 5 mg by mouth 3 times daily      loperamide (IMODIUM) 2 MG capsule Take 2 mg by mouth 4 times daily as needed for Diarrhea      levothyroxine (SYNTHROID) 50 MCG tablet Take 1 tablet by mouth Daily 30 tablet 3    metoclopramide (REGLAN) 5 MG tablet Take 0.5 tablets by mouth 3 times daily as needed (Nausea) 120 tablet 3    miconazole nitrate 2 % OINT Apply topically 2 times daily GROIN AND BUTTOCKS  0    amiodarone (CORDARONE) 200 MG tablet Take 0.5 tablets by mouth daily      mineral oil-hydrophilic petrolatum (AQUAPHOR) ointment Apply topically as needed for Dry Skin Apply topically as needed.       bumetanide (BUMEX) 0.5 MG tablet Take 0.5 mg by mouth every other day      diphenhydrAMINE (BENADRYL) 25 MG tablet Take 25 mg by mouth every 6 hours as needed for Itching      enalapril (VASOTEC) 5 MG tablet Take 5 mg by mouth daily      Sodium Phosphates (FLEET) 7-19 GM/118ML Place 1 enema rectally as needed      loratadine (CLARITIN) 10 MG tablet Take 10 mg by mouth daily      magnesium hydroxide (MILK OF MAGNESIA) 400 MG/5ML suspension Take 30 mLs by mouth daily as needed for Constipation      sertraline (ZOLOFT) 50 MG tablet Take 50 mg by mouth daily  Multiple Vitamins-Minerals (THERAPEUTIC MULTIVITAMIN-MINERALS) tablet Take 1 tablet by mouth daily      acetaminophen (TYLENOL) 325 MG tablet Take 2 tablets by mouth every 4 hours as needed for Fever 120 tablet 3    docusate sodium (COLACE, DULCOLAX) 100 MG CAPS Take 100 mg by mouth daily      benzocaine-menthol (CEPACOL SORE THROAT) 15-3.6 MG lozenge Take 1 lozenge by mouth 4 times daily as needed for Sore Throat 168 lozenge     famotidine (PEPCID) 10 MG tablet Take 1 tablet by mouth daily 60 tablet 3    nitroGLYCERIN (NITROSTAT) 0.4 MG SL tablet up to max of 3 total doses. If no relief after 1 dose, call 911. 25 tablet 3     No current facility-administered medications on file prior to encounter. REVIEW OF SYSTEMS See HPI    Objective:    BP 98/60   Pulse 74   Temp 97.7 °F (36.5 °C) (Oral)   Resp 18   Ht 5' (1.524 m)   Wt 120 lb (54.4 kg)   BMI 23.44 kg/m²   Wt Readings from Last 3 Encounters:   06/06/19 120 lb (54.4 kg)   05/03/19 120 lb (54.4 kg)   04/12/19 120 lb (54.4 kg)     PHYSICAL EXAM  CONSTITUTIONAL:   Awake, alert, cooperative   EYES:  lids and lashes normal   ENT: external ears and nose without lesions   NECK:  supple, symmetrical, trachea midline   SKIN:  Open wound Present    Assessment:     Problem List Items Addressed This Visit     None        Procedure Note  Indications:  Based on my examination of this patient's wound(s)/ulcer(s) today, debridement is required to promote healing and evaluate the wound base. Performed by: CALLIE Brantley CNP    Consent obtained:  Yes    Time out taken:  Yes    Pain Control: Anesthetic  Anesthetic: 2% Lidocaine Gel Topical     Debridement:Excisional Debridement    Using curette the wound(s)/ulcer(s) was/were sharply debrided down through and including the removal of subcutaneous tissue.         Devitalized Tissue Debrided:  biofilm to stimulate bleeding to promote healing, post debridement good bleeding base and wound edges noted    Pre Debridement Measurements:  Are located in the [facility]  Documentation Flow Sheet    Wound/Ulcer #: 1    Post Debridement Measurements:  Wound/Ulcer Descriptions are Pre Debridement except measurements:    Wound 03/09/18 Coccyx #1 stage 3 acq 2/2/18 (Active)   Wound Image   3/29/2019  8:05 AM   Wound Pressure Stage  3 11/30/2018  8:06 AM   Dressing Status Clean;Dry; Intact 3/29/2019  8:53 AM   Dressing Changed Changed/New 5/3/2019  8:51 AM   Dressing/Treatment Alginate;Dry Dressing 5/3/2019  8:51 AM   Wound Cleansed Rinsed/Irrigated with saline 5/3/2019  8:51 AM   Wound Length (cm) 1.2 cm 6/6/2019  8:39 AM   Wound Width (cm) 1.1 cm 6/6/2019  8:39 AM   Wound Depth (cm) 0.5 cm 6/6/2019  8:39 AM   Wound Surface Area (cm^2) 1.32 cm^2 6/6/2019  8:39 AM   Change in Wound Size % (l*w) -76 6/6/2019  8:39 AM   Wound Volume (cm^3) 0.66 cm^3 6/6/2019  8:39 AM   Wound Healing % -120 6/6/2019  8:39 AM   Post-Procedure Length (cm) 1.2 cm 6/6/2019  8:53 AM   Post-Procedure Width (cm) 1.1 cm 6/6/2019  8:53 AM   Post-Procedure Depth (cm) 0.5 cm 6/6/2019  8:53 AM   Post-Procedure Surface Area (cm^2) 1.32 cm^2 6/6/2019  8:53 AM   Post-Procedure Volume (cm^3) 0.66 cm^3 6/6/2019  8:53 AM   Undermining Starts ___ O'Clock 9 6/6/2019  8:39 AM   Undermining Ends___ O'Clock 12 6/6/2019  8:39 AM   Undermining Maxium Distance (cm) 0.8 6/6/2019  8:39 AM   Wound Assessment Pink; White 6/6/2019  8:39 AM   Drainage Amount Small 6/6/2019  8:39 AM   Drainage Description Yellow 6/6/2019  8:39 AM   Odor None 6/6/2019  8:39 AM   Divya-wound Assessment Pink 6/6/2019  8:39 AM   Number of days: 453       Wound 01/01/19 Coccyx Right;Upper ulceration (Active)   Number of days: 156       Wound 01/01/19 (Active)   Number of days: 155       Wound 01/01/19 Ischium Right; Lower; Posterior ulceration (Active)   Number of days: 155       Wound 01/01/19 Ischium Posterior ulceration (Active)   Number of days: 155     Percent of Wound/Ulcer Debrided: 100%    Total Surface Area Debrided:  1.32 sq cm     Estimated Blood Loss:  Minimal  Hemostasis Achieved:  by pressure    Procedural Pain:  3  / 10   Post Procedural Pain:  2 / 10     Response to treatment:  Well tolerated by patient. Plan:   Treatment Note please see attached Discharge Instructions    Written patient dismissal instructions given to patient and signed by patient or POA. Discharge Instructions         Visit Discharge/Physician Orders    Discharge condition: Stable     Assessment of pain at discharge: NONE      Anesthetic used:  2% LIDOCAINE GEL     Discharge to: ECF      Left via:ambulance     Accompanied by: family      ECF/HHA: [facility] phone: [phone] Jack Peña: [phone]     Dressing Orders:  COCCYX:   CLEANSE WOUND WITH NORMAL STERILE SALINE. Pack lightly with AQUACEL and Continue TO APPLY TRIAMCINOLONE 0.1% CREAM to b/l buttocks and cover with ABD pad with little to no tape covering the entire coccyx area, change ABD pad twice daily and as needed.  Please do not put patient in a brief, leave her open to air when in bed unless she is having diarrhea.  If patient is in bed without bottoms on please make sure the wound is covered, you may apply small amount of tape to keep it in place, wound should be covered at all times!     MAINTAIN KOEHLER CATH UNTIL STAGE 3 PRESSURE ULCER IS HEALED      Treatment Orders:  FOLLOW NUTRITIOUS DIET. CHOOSE FOODS HIGH IN PROTEIN -CHICKEN- FISH-AND EGGS,  CHOOSE FOODS HIGH IN VITAMIN C.   MULTIVITAMIN DAILY.       LOW AIR LOSS MATTRESS  BED.       REPOSITION EVERY 1-2 HOURS. KEEP PRESSURE OFF WOUNDS. - SIDE TO SIDE AS MUCH AS POSSIBLE      NOT TO SIT FOR PROLONGED PERIOD OF TIMES. TODAY'S WOUND MEASUREMENTS:            Wound 03/09/18 Coccyx #1 stage 3 acq 2/2/18 (Active)   Wound Image   3/29/2019  8:05 AM   Wound Pressure Stage  3 11/30/2018  8:06 AM   Dressing Status Clean;Dry; Intact 3/29/2019  8:53 AM   Dressing Changed Changed/New 5/3/2019  8:51 AM   Dressing/Treatment Alginate;Dry Dressing 5/3/2019  8:51 AM   Wound Cleansed Rinsed/Irrigated with saline 5/3/2019  8:51 AM   Wound Length (cm) 1.2 cm 6/6/2019  8:39 AM   Wound Width (cm) 1.1 cm 6/6/2019  8:39 AM   Wound Depth (cm) 0.5 cm 6/6/2019  8:39 AM   Wound Surface Area (cm^2) 1.32 cm^2 6/6/2019  8:39 AM   Change in Wound Size % (l*w) -76 6/6/2019  8:39 AM   Wound Volume (cm^3) 0.66 cm^3 6/6/2019  8:39 AM   Wound Healing % -120 6/6/2019  8:39 AM   Post-Procedure Length (cm) 2.2 cm 5/3/2019  8:50 AM   Post-Procedure Width (cm) 1.3 cm 5/3/2019  8:50 AM   Post-Procedure Depth (cm) 0.6 cm 5/3/2019  8:50 AM   Post-Procedure Surface Area (cm^2) 2.86 cm^2 5/3/2019  8:50 AM   Post-Procedure Volume (cm^3) 1.72 cm^3 5/3/2019  8:50 AM   Undermining Starts ___ O'Clock 9 6/6/2019  8:39 AM   Undermining Ends___ O'Clock 12 6/6/2019  8:39 AM   Undermining Maxium Distance (cm) 0.8 6/6/2019  8:39 AM   Wound Assessment Pink; White 6/6/2019  8:39 AM   Drainage Amount Small 6/6/2019  8:39 AM   Drainage Description Yellow 6/6/2019  8:39 AM   Odor None 6/6/2019  8:39 AM   Divya-wound Assessment Pink 6/6/2019  8:39 AM   Number of days: 453       Wound 01/01/19 Coccyx Right;Upper ulceration (Active)   Number of days: 155       Wound 01/01/19 (Active)   Number of days: 155       Wound 01/01/19 Ischium Right; Lower; Posterior ulceration (Active)   Number of days: 155       Wound 01/01/19 Ischium Posterior ulceration (Active)   Number of days: 155       [facility] [facility] followup visit _____3 WEEKS __Wednesday afternoons____________________  (Please note your next appointment above and if you are unable to keep, kindly give a 24 hour notice.  Thank you.)    Physician signature:__________________________      If you experience any of the following, please call the [address] [place] [address] Road during business hours:    * Increase in Pain  * Temperature over 101  * Increase in drainage from your wound  * Drainage with a foul

## 2019-06-26 NOTE — PROGRESS NOTES
[facility] Followup Visit Note    Referring Physician : Kenton Severs, MD  Starr Villasenor RECORD NUMBER:  [id number]  AGE: 80 y.o. GENDER: female  : 1922  EPISODE DATE:  2019    Subjective:     Chief Complaint   Patient presents with    Wound Check     pressure ulcer coccyx      HISTORY of PRESENT ILLNESS SORIN Thurston is a 80 y.o. female who presents today for wound/ulcer evaluation. History of Wound Context:  pressure     Wound/Ulcer Pain Timing/Severity: none  Quality of pain: N/A  Severity:  0 / 10   Modifying Factors: None  Associated Signs/Symptoms: none    Ulcer Identification:  Ulcer Type: pressure  Contributing Factors: decreased mobility    Diabetic/Pressure/Non Pressure Ulcers only:  Ulcer: Pressure ulcer, Stage 3    Wound: N/A        PAST MEDICAL HISTORY      Diagnosis Date    Acute blood loss anemia 2/10/2018    MYRIAM (acute kidney injury) ([address] [address]     Atrial fibrillation with RVR ([facility] [address] 2018    COPD [facility]chronic obstructive pulmonary disease[facility] ([facility]) 2018    CXR    Mild aortic sclerosis ([address] 2018    PAF [facility]paroxysmal atrial fibrillation) ([facility]) 2019    Rectal bleed 2019    Scoliosis (and kyphoscoliosis), idiopathic     Thyroid disease      Past Surgical History:   Procedure Laterality Date    CYSTOSCOPY N/A 3/12/2019    CYSTOSCOPY RETROGRADE  SUPRAPUBIC TUBE PLACEMENT [place] [address] [facility] [place]      cataracts    FRACTURE SURGERY      FRACTURE SURGERY Right 2018    ORIF right hip    TONSILLECTOMY AND ADENOIDECTOMY       History reviewed. No pertinent family history.   Social History     Tobacco Use    Smoking status: Never Smoker    Smokeless tobacco: Never Used   Substance Use Topics    Alcohol use: No    Drug use: No     Allergies   Allergen Reactions    Shellfish-Derived Products Anaphylaxis    Pcn [Penicillins]     Adhesive Tape Rash     Current Outpatient Medications on File Prior to Encounter   Medication Sig Dispense Refill    oxybutynin (DITROPAN) 5 MG tablet Take 5 mg by mouth 3 times daily      loperamide (IMODIUM) 2 MG capsule Take 2 mg by mouth 4 times daily as needed for Diarrhea      levothyroxine (SYNTHROID) 50 MCG tablet Take 1 tablet by mouth Daily 30 tablet 3    metoclopramide (REGLAN) 5 MG tablet Take 0.5 tablets by mouth 3 times daily as needed (Nausea) 120 tablet 3    miconazole nitrate 2 % OINT Apply topically 2 times daily GROIN AND BUTTOCKS  0    amiodarone (CORDARONE) 200 MG tablet Take 0.5 tablets by mouth daily      mineral oil-hydrophilic petrolatum (AQUAPHOR) ointment Apply topically as needed for Dry Skin Apply topically as needed.  bumetanide (BUMEX) 0.5 MG tablet Take 0.5 mg by mouth every other day      diphenhydrAMINE (BENADRYL) 25 MG tablet Take 25 mg by mouth every 6 hours as needed for Itching      enalapril (VASOTEC) 5 MG tablet Take 5 mg by mouth daily      Sodium Phosphates (FLEET) 7-19 GM/118ML Place 1 enema rectally as needed      loratadine (CLARITIN) 10 MG tablet Take 10 mg by mouth daily      magnesium hydroxide (MILK OF MAGNESIA) 400 MG/5ML suspension Take 30 mLs by mouth daily as needed for Constipation      sertraline (ZOLOFT) 50 MG tablet Take 50 mg by mouth daily      Multiple Vitamins-Minerals (THERAPEUTIC MULTIVITAMIN-MINERALS) tablet Take 1 tablet by mouth daily      acetaminophen (TYLENOL) 325 MG tablet Take 2 tablets by mouth every 4 hours as needed for Fever 120 tablet 3    docusate sodium (COLACE, DULCOLAX) 100 MG CAPS Take 100 mg by mouth daily      benzocaine-menthol (CEPACOL SORE THROAT) 15-3.6 MG lozenge Take 1 lozenge by mouth 4 times daily as needed for Sore Throat 168 lozenge     famotidine (PEPCID) 10 MG tablet Take 1 tablet by mouth daily 60 tablet 3    nitroGLYCERIN (NITROSTAT) 0.4 MG SL tablet up to max of 3 total doses.  If no relief after 1 dose, call 911. 25 tablet 3     No current facility-administered medications on file prior to encounter. REVIEW OF SYSTEMS See HPI    Objective:    /82   Pulse 82   Temp 98.2 °F (36.8 °C) (Oral)   Resp 16   Ht 5' (1.524 m)   Wt 120 lb (54.4 kg)   BMI 23.44 kg/m²   Wt Readings from Last 3 Encounters:   06/26/19 120 lb (54.4 kg)   06/06/19 120 lb (54.4 kg)   05/03/19 120 lb (54.4 kg)     PHYSICAL EXAM  CONSTITUTIONAL:   Awake, alert, cooperative   EYES:  lids and lashes normal   ENT: external ears and nose without lesions   NECK:  supple, symmetrical, trachea midline   SKIN:  Open wound Present    Assessment:      Active Hospital Problems    Diagnosis    Decubitus ulcer of sacral region, stage 3 ([address] [L89.153]     Procedure Note  Indications:  Based on my examination of this patient's wound(s)/ulcer(s) today, debridement is required to promote healing and evaluate the wound base. Performed by: Didier Morrell MD    Consent obtained:  Yes    Time out taken:  Yes    Pain Control: Anesthetic  Anesthetic: 2% Lidocaine Gel Topical     Debridement:Excisional Debridement    Using curette the wound(s)/ulcer(s) was/were sharply debrided down through and including the removal of subcutaneous tissue. Devitalized Tissue Debrided:  slough to stimulate bleeding to promote healing, post debridement good bleeding base and wound edges noted    Pre Debridement Measurements:  Are located in the [facility]  Documentation Flow Sheet    Wound/Ulcer #: 1    Post Debridement Measurements:  Wound/Ulcer Descriptions are Pre Debridement except measurements:    Wound 03/09/18 Coccyx #1 stage 3 acq 2/2/18 (Active)   Dressing Status Clean;Dry; Intact 6/6/2019  9:10 AM   Dressing Changed Changed/New 6/6/2019  9:10 AM   Dressing/Treatment ABD; Alginate with Ag;Dry Dressing 6/6/2019  9:10 AM   Wound Cleansed Rinsed/Irrigated with saline 6/6/2019  9:10 AM   Wound Length (cm) 1.8 cm 6/26/2019  1:17 PM   Wound Width (cm) 0.7 cm 6/26/2019  1:17 PM   Wound Depth (cm) 1 cm 6/26/2019  1:17 PM   Wound Surface Area (cm^2) 1.26 cm^2 6/26/2019  1:17 PM   Change in Wound Size % (l*w) -68 6/26/2019  1:17 PM   Wound Volume (cm^3) 1.26 cm^3 6/26/2019  1:17 PM   Wound Healing % -320 6/26/2019  1:17 PM   Post-Procedure Length (cm) 2 cm 6/26/2019  1:49 PM   Post-Procedure Width (cm) 0.8 cm 6/26/2019  1:49 PM   Post-Procedure Depth (cm) 1 cm 6/26/2019  1:49 PM   Post-Procedure Surface Area (cm^2) 1.6 cm^2 6/26/2019  1:49 PM   Post-Procedure Volume (cm^3) 1.6 cm^3 6/26/2019  1:49 PM   Undermining Starts ___ O'Clock 9 6/26/2019  1:17 PM   Undermining Ends___ O'Clock 5 6/26/2019  1:17 PM   Undermining Maxium Distance (cm) 1.2 6/26/2019  1:17 PM   Wound Assessment Pink; White 6/26/2019  1:17 PM   Drainage Amount Small 6/26/2019  1:17 PM   Drainage Description Yellow 6/26/2019  1:17 PM   Odor None 6/26/2019  1:17 PM   Divya-wound Assessment Pink 6/26/2019  1:17 PM   Number of days: 474       Wound 01/01/19 Coccyx Right;Upper ulceration (Active)   Number of days: 176       Wound 01/01/19 (Active)   Number of days: 176       Wound 01/01/19 Ischium Right; Lower; Posterior ulceration (Active)   Number of days: 176       Wound 01/01/19 Ischium Posterior ulceration (Active)   Number of days: 176     Incision 03/12/19 Abdomen Lower;Medial (Active)   Number of days: 106       Incision 03/12/19 Vagina (Active)   Number of days: 106     Percent of Wound/Ulcer Debrided: 100%    Total Surface Area Debrided:  2 sq cm     Estimated Blood Loss:  None  Hemostasis Achieved:  by pressure    Procedural Pain:  3  / 10   Post Procedural Pain:  0 / 10     Response to treatment:  Well tolerated by patient. Plan:   Treatment Note please see attached Discharge Instructions    Written patient dismissal instructions given to patient and signed by patient or POA.          Discharge Instructions       Visit Discharge/Physician Orders     Discharge condition: Stable     Assessment of pain at discharge: NONE      Anesthetic used:  2% LIDOCAINE GEL     Discharge to: ECF      Left via:ambulance     Accompanied by: family      ECF/[facility]: [facility] phone: [phone] [place]: [phone]     Dressing Orders:  COCCYX:   CLEANSE WOUND WITH NORMAL STERILE SALINE. Begin to apply santyl to the thickness of a nickel to the yellow slough area of the wound and  Pack lightly with AQUACEL and BEGIN TO APPLY TRIAMCINOLONE 0.1% CREAM to b/l buttocks and cover with ABD pad with little to no tape covering the entire coccyx area, change ABD pad twice daily and as needed.      Please do not put patient in a brief, leave her open to air when in bed unless she is having diarrhea.  If patient is in bed without bottoms on please make sure the wound is covered, you may apply small amount of tape to keep it in place, wound should be covered at all times!     MAINTAIN KOEHLER CATH UNTIL STAGE 3 PRESSURE ULCER IS HEALED      Treatment Orders:  FOLLOW NUTRITIOUS DIET. CHOOSE FOODS HIGH IN PROTEIN -CHICKEN- FISH-AND EGGS,  CHOOSE FOODS HIGH IN VITAMIN C.   MULTIVITAMIN DAILY.       LOW AIR LOSS MATTRESS  BED.       REPOSITION EVERY 1-2 HOURS. KEEP PRESSURE OFF WOUNDS. - SIDE TO SIDE AS MUCH AS POSSIBLE      NOT TO SIT FOR PROLONGED PERIOD OF TIMES.      [facility] followup visit _____3 WEEKS ______________________  (Please note your next appointment above and if you are unable to keep, kindly give a 24 hour notice.  Thank you.)     Physician signature:__________________________        If you experience any of the following, please call the [facility] during business hours:     * Increase in Pain  * Temperature over 101  * Increase in drainage from your wound  * Drainage with a foul odor  * Bleeding  * Increase in swelling  * Need for compression bandage changes due to slippage, breakthrough drainage.     If you need medical attention outside of the business hours of the [facility] please contact your PCP or go to the nearest emergency room.        Electronically signed by García Bhatti MD on 6/26/2019 at 1:51 PM

## 2019-07-17 NOTE — PROGRESS NOTES
Encounter   Medication Sig Dispense Refill    oxybutynin (DITROPAN) 5 MG tablet Take 5 mg by mouth 3 times daily      levothyroxine (SYNTHROID) 50 MCG tablet Take 1 tablet by mouth Daily 30 tablet 3    miconazole nitrate 2 % OINT Apply topically 2 times daily GROIN AND BUTTOCKS  0    amiodarone (CORDARONE) 200 MG tablet Take 0.5 tablets by mouth daily      bumetanide (BUMEX) 0.5 MG tablet Take 0.5 mg by mouth every other day      enalapril (VASOTEC) 5 MG tablet Take 5 mg by mouth daily      loratadine (CLARITIN) 10 MG tablet Take 10 mg by mouth daily      sertraline (ZOLOFT) 50 MG tablet Take 50 mg by mouth daily      Multiple Vitamins-Minerals (THERAPEUTIC MULTIVITAMIN-MINERALS) tablet Take 1 tablet by mouth daily      acetaminophen (TYLENOL) 325 MG tablet Take 2 tablets by mouth every 4 hours as needed for Fever 120 tablet 3    nitroGLYCERIN (NITROSTAT) 0.4 MG SL tablet up to max of 3 total doses. If no relief after 1 dose, call 911. 25 tablet 3    loperamide (IMODIUM) 2 MG capsule Take 2 mg by mouth 4 times daily as needed for Diarrhea      metoclopramide (REGLAN) 5 MG tablet Take 0.5 tablets by mouth 3 times daily as needed (Nausea) 120 tablet 3    mineral oil-hydrophilic petrolatum (AQUAPHOR) ointment Apply topically as needed for Dry Skin Apply topically as needed.       diphenhydrAMINE (BENADRYL) 25 MG tablet Take 25 mg by mouth every 6 hours as needed for Itching      Sodium Phosphates (FLEET) 7-19 GM/118ML Place 1 enema rectally as needed      magnesium hydroxide (MILK OF MAGNESIA) 400 MG/5ML suspension Take 30 mLs by mouth daily as needed for Constipation      docusate sodium (COLACE, DULCOLAX) 100 MG CAPS Take 100 mg by mouth daily      benzocaine-menthol (CEPACOL SORE THROAT) 15-3.6 MG lozenge Take 1 lozenge by mouth 4 times daily as needed for Sore Throat 168 lozenge     famotidine (PEPCID) 10 MG tablet Take 1 tablet by mouth daily 60 tablet 3     No current facility-administered medications on file prior to encounter. REVIEW OF SYSTEMS See HPI    Objective:    /74   Pulse 76   Temp 98.1 °F (36.7 °C) (Oral)   Resp 16   Wt Readings from Last 3 Encounters:   06/26/19 120 lb (54.4 kg)   06/06/19 120 lb (54.4 kg)   05/03/19 120 lb (54.4 kg)     PHYSICAL EXAM  CONSTITUTIONAL:   Awake, alert, cooperative   EYES:  lids and lashes normal   ENT: external ears and nose without lesions   NECK:  supple, symmetrical, trachea midline   SKIN:  Open wound Present    Assessment:      Active Hospital Problems    Diagnosis    Decubitus ulcer of sacral region, stage 3 ([address] [L89.153]     Procedure Note  Indications:  Based on my examination of this patient's wound(s)/ulcer(s) today, debridement is required to promote healing and evaluate the wound base. Performed by: Juan Alberto Willard MD    Consent obtained:  Yes    Time out taken:  Yes    Pain Control:       Debridement:Excisional Debridement    Using curette the wound(s)/ulcer(s) was/were sharply debrided down through and including the removal of subcutaneous tissue. Devitalized Tissue Debrided:  slough to stimulate bleeding to promote healing, post debridement good bleeding base and wound edges noted    Pre Debridement Measurements:  Are located in the [facility]  Documentation Flow Sheet    Wound/Ulcer #: 1    Post Debridement Measurements:  Wound/Ulcer Descriptions are Pre Debridement except measurements:    Wound 03/09/18 Coccyx #1 stage 3 acq 2/2/18 (Active)   Dressing Status Other (Comment) 7/17/2019  1:56 PM   Dressing Changed Changed/New 6/26/2019  2:00 PM   Dressing/Treatment ABD; Alginate 6/26/2019  2:00 PM   Wound Length (cm) 1.4 cm 7/17/2019  1:56 PM   Wound Width (cm) 0.8 cm 7/17/2019  1:56 PM   Wound Depth (cm) 0.6 cm 7/17/2019  1:56 PM   Wound Surface Area (cm^2) 1.12 cm^2 7/17/2019  1:56 PM   Change in Wound Size % (l*w) -49.33 7/17/2019  1:56 PM   Wound Volume (cm^3) 0.67 cm^3 7/17/2019  1:56 PM   Wound Healing %

## 2019-08-21 PROBLEM — L89.153 DECUBITUS ULCER OF SACRAL REGION, STAGE 3 (HCC): Chronic | Status: ACTIVE | Noted: 2018-03-09

## 2019-08-21 PROBLEM — L89.153 DECUBITUS ULCER OF SACRAL REGION, STAGE 3 (HCC): Status: RESOLVED | Noted: 2018-03-09 | Resolved: 2019-01-01

## 2021-05-14 NOTE — PROGRESS NOTES
Wound Healing Center Followup Visit Note    Referring Physician : Zac Mead MD  [address] RECORD NUMBER:  [id number]  AGE: 80 y.o. GENDER: female  : 1922  EPISODE DATE:  2018    Subjective:     Chief Complaint   Patient presents with    Wound Check     coccyx      HISTORY of PRESENT ILLNESS HPI   Nani Seth is a 80 y.o. female who presents today for wound/ulcer evaluation. History of Wound Context:  The patient has had a wound of of the sacral region which was first noted approximately mid 2017. It was discovered following a hospitalization for a fractured femur. She now resides in a nursing home She has been making steady progress with consistent offloading. The wound has improved to a stage 2 pressure ulcer.      Wound/Ulcer Pain Timing/Severity: constant  Quality of pain: burning  Severity:  2 / 10   Modifying Factors: Pain worsens with pressure  Associated Signs/Symptoms: pain     Ulcer Identification:  Ulcer Type: pressure  Contributing Factors: chronic pressure, decreased mobility and shear force     Diabetic/Pressure/Non Pressure Ulcers only:  Ulcer: Pressure ulcer, Stage 2     Wound: N/A        PAST MEDICAL HISTORY      Diagnosis Date    Acute blood loss anemia 2/10/2018    Atrial fibrillation with RVR ([facility]) 2018    Chest pain due to myocardial ischemia 2018    Scoliosis (and kyphoscoliosis), idiopathic      Past Surgical History:   Procedure Laterality Date    EYE SURGERY  2012    cataracts    FRACTURE SURGERY      FRACTURE SURGERY Right 2018    ORIF right hip     History reviewed. No pertinent family history.   Social History   Substance Use Topics    Smoking status: Never Smoker    Smokeless tobacco: Never Used    Alcohol use No     Allergies   Allergen Reactions    Shellfish-Derived Products Anaphylaxis    Pcn [Penicillins]     Adhesive Tape Rash     Current Outpatient Prescriptions on File Prior to Encounter   Medication Sig Dispense Refill    acetaminophen (TYLENOL) 325 MG tablet Take 2 tablets by mouth every 4 hours as needed for Fever 120 tablet 3    amiodarone (CORDARONE) 200 MG tablet Take 1 tablet by mouth 2 times daily 30 tablet 3    ondansetron (ZOFRAN-ODT) 4 MG disintegrating tablet Take 1 tablet by mouth every 8 hours as needed for Nausea or Vomiting      docusate sodium (COLACE, DULCOLAX) 100 MG CAPS Take 100 mg by mouth daily      sennosides-docusate sodium (SENOKOT-S) 8.6-50 MG tablet Take 1 tablet by mouth daily      benzocaine-menthol (CEPACOL SORE THROAT) 15-3.6 MG lozenge Take 1 lozenge by mouth 4 times daily as needed for Sore Throat 168 lozenge     famotidine (PEPCID) 10 MG tablet Take 1 tablet by mouth daily 60 tablet 3    aspirin 81 MG chewable tablet Take 1 tablet by mouth daily 30 tablet 3    nitroGLYCERIN (NITROSTAT) 0.4 MG SL tablet up to max of 3 total doses. If no relief after 1 dose, call 911. 25 tablet 3    clopidogrel (PLAVIX) 75 MG tablet Take 1 tablet by mouth daily 30 tablet 3     No current facility-administered medications on file prior to encounter. REVIEW OF SYSTEMS See HPI    Objective:    /62   Pulse 64   Temp 97.9 °F (36.6 °C) (Oral)   Resp 16   Wt Readings from Last 3 Encounters:   08/24/18 118 lb (53.5 kg)   08/03/18 109 lb (49.4 kg)   07/13/18 109 lb (49.4 kg)     PHYSICAL EXAM  CONSTITUTIONAL:   Awake, alert, cooperative   EYES:  lids and lashes normal   ENT: external ears and nose without lesions   NECK:  supple, symmetrical, trachea midline   SKIN:  Open wound Present    Assessment:     Problem List Items Addressed This Visit     None        Procedure Note  Indications:  Based on my examination of this patient's wound(s)/ulcer(s) today, debridement is required to promote healing and evaluate the wound base.     Performed by: CALLIE Cantrell CNP    Consent obtained:  Yes    Time out taken:  Yes    Pain Control: Anesthetic  Anesthetic: 2% Lidocaine Gel Topical PCP or go to the nearest emergency room.         Electronically signed by CALLIE Mccullough CNP on 9/14/2018 at 10:10 AM no

## (undated) DEVICE — Z DUP USE 2139333 GUIDEWIRE UROLOGICAL STR STD 0.035 IN X150 CM REG ZIPWIRE LF

## (undated) DEVICE — Z INACTIVE USE 2660664 SOLUTION IRRIG 3000ML 0.9% SOD CHL USP UROMATIC PLAS CONT

## (undated) DEVICE — COUNTER NDL 30 COUNT DBL MAG

## (undated) DEVICE — CONTROL SYRINGE LUER-LOCK TIP: Brand: MONOJECT

## (undated) DEVICE — CATHETER ETER URETH 30FR BLLN 5CC LTX 2 W F BARDX LUB

## (undated) DEVICE — 4-PORT MANIFOLD: Brand: NEPTUNE 2

## (undated) DEVICE — MEDIA CONTRAST ISOVUE  300 10X50ML

## (undated) DEVICE — SPONGE,DRAIN,NONWVN,4"X4",6PLY,STRL,LF: Brand: MEDLINE

## (undated) DEVICE — GOWN,SIRUS,FABRNF,XL,20/CS: Brand: MEDLINE

## (undated) DEVICE — GAUZE,SPONGE,4"X4",16PLY,XRAY,STRL,LF: Brand: MEDLINE

## (undated) DEVICE — CATHETER URETH 20FR BLLN 5CC STD LTX HYDRGEL 2 W F BARDX

## (undated) DEVICE — HOSE CONN FOR WST MGMT SYS NEPTUNE 2

## (undated) DEVICE — GAUZE,SPONGE,4"X4",8PLY,STRL,LF,10/TRAY: Brand: MEDLINE

## (undated) DEVICE — INTENDED FOR TISSUE SEPARATION, AND OTHER PROCEDURES THAT REQUIRE A SHARP SURGICAL BLADE TO PUNCTURE OR CUT.: Brand: BARD-PARKER ® STAINLESS STEEL BLADES

## (undated) DEVICE — Z INACTIVE USE 2635503 SOLUTION IRRIG 3000ML ST H2O USP UROMATIC PLAS CONT

## (undated) DEVICE — CYSTO PACK: Brand: MEDLINE INDUSTRIES, INC.

## (undated) DEVICE — BAG DRNGE COMB PK

## (undated) DEVICE — CATHETER URET OLV TIP 5FRX70CM FLEXIMA

## (undated) DEVICE — STANDARD HYPODERMIC NEEDLE,POLYPROPYLENE HUB: Brand: MONOJECT

## (undated) DEVICE — SOLUTION SURG PREP ANTIMICROBIAL 4 OZ SKIN WND EXIDINE